# Patient Record
Sex: FEMALE | Race: BLACK OR AFRICAN AMERICAN | NOT HISPANIC OR LATINO | URBAN - METROPOLITAN AREA
[De-identification: names, ages, dates, MRNs, and addresses within clinical notes are randomized per-mention and may not be internally consistent; named-entity substitution may affect disease eponyms.]

---

## 2017-02-25 ENCOUNTER — EMERGENCY (EMERGENCY)
Facility: HOSPITAL | Age: 32
LOS: 1 days | Discharge: HOME | End: 2017-02-25
Attending: EMERGENCY MEDICINE | Admitting: EMERGENCY MEDICINE
Payer: COMMERCIAL

## 2017-02-25 VITALS
HEART RATE: 86 BPM | RESPIRATION RATE: 16 BRPM | SYSTOLIC BLOOD PRESSURE: 119 MMHG | TEMPERATURE: 99 F | OXYGEN SATURATION: 100 % | DIASTOLIC BLOOD PRESSURE: 72 MMHG

## 2017-02-25 VITALS
HEART RATE: 66 BPM | TEMPERATURE: 98 F | RESPIRATION RATE: 18 BRPM | DIASTOLIC BLOOD PRESSURE: 52 MMHG | SYSTOLIC BLOOD PRESSURE: 110 MMHG | OXYGEN SATURATION: 100 %

## 2017-02-25 DIAGNOSIS — Z98.890 OTHER SPECIFIED POSTPROCEDURAL STATES: Chronic | ICD-10-CM

## 2017-02-25 LAB
ALBUMIN SERPL ELPH-MCNC: 4.1 G/DL — SIGNIFICANT CHANGE UP (ref 3.3–5)
ALP SERPL-CCNC: 55 U/L — SIGNIFICANT CHANGE UP (ref 40–120)
ALT FLD-CCNC: 15 U/L — SIGNIFICANT CHANGE UP (ref 4–33)
APPEARANCE UR: CLEAR — SIGNIFICANT CHANGE UP
AST SERPL-CCNC: 19 U/L — SIGNIFICANT CHANGE UP (ref 4–32)
BACTERIA # UR AUTO: SIGNIFICANT CHANGE UP
BASOPHILS # BLD AUTO: 0.03 K/UL — SIGNIFICANT CHANGE UP (ref 0–0.2)
BASOPHILS NFR BLD AUTO: 0.2 % — SIGNIFICANT CHANGE UP (ref 0–2)
BILIRUB SERPL-MCNC: 0.2 MG/DL — SIGNIFICANT CHANGE UP (ref 0.2–1.2)
BILIRUB UR-MCNC: NEGATIVE — SIGNIFICANT CHANGE UP
BLOOD UR QL VISUAL: NEGATIVE — SIGNIFICANT CHANGE UP
BUN SERPL-MCNC: 12 MG/DL — SIGNIFICANT CHANGE UP (ref 7–23)
CALCIUM SERPL-MCNC: 9.1 MG/DL — SIGNIFICANT CHANGE UP (ref 8.4–10.5)
CHLORIDE SERPL-SCNC: 104 MMOL/L — SIGNIFICANT CHANGE UP (ref 98–107)
CO2 SERPL-SCNC: 23 MMOL/L — SIGNIFICANT CHANGE UP (ref 22–31)
COLOR SPEC: SIGNIFICANT CHANGE UP
CREAT SERPL-MCNC: 0.76 MG/DL — SIGNIFICANT CHANGE UP (ref 0.5–1.3)
EOSINOPHIL # BLD AUTO: 0.05 K/UL — SIGNIFICANT CHANGE UP (ref 0–0.5)
EOSINOPHIL NFR BLD AUTO: 0.3 % — SIGNIFICANT CHANGE UP (ref 0–6)
GLUCOSE SERPL-MCNC: 91 MG/DL — SIGNIFICANT CHANGE UP (ref 70–99)
GLUCOSE UR-MCNC: NEGATIVE — SIGNIFICANT CHANGE UP
HCG SERPL-ACNC: < 5 MIU/ML — SIGNIFICANT CHANGE UP
HCG UR-SCNC: NEGATIVE — SIGNIFICANT CHANGE UP
HCT VFR BLD CALC: 36.5 % — SIGNIFICANT CHANGE UP (ref 34.5–45)
HGB BLD-MCNC: 11.1 G/DL — LOW (ref 11.5–15.5)
HIV1 AG SER QL: SIGNIFICANT CHANGE UP
HIV1+2 AB SPEC QL: SIGNIFICANT CHANGE UP
IMM GRANULOCYTES NFR BLD AUTO: 0.3 % — SIGNIFICANT CHANGE UP (ref 0–1.5)
KETONES UR-MCNC: NEGATIVE — SIGNIFICANT CHANGE UP
LEUKOCYTE ESTERASE UR-ACNC: SIGNIFICANT CHANGE UP
LYMPHOCYTES # BLD AUTO: 21 % — SIGNIFICANT CHANGE UP (ref 13–44)
LYMPHOCYTES # BLD AUTO: 3.82 K/UL — HIGH (ref 1–3.3)
MCHC RBC-ENTMCNC: 23.7 PG — LOW (ref 27–34)
MCHC RBC-ENTMCNC: 30.4 % — LOW (ref 32–36)
MCV RBC AUTO: 78 FL — LOW (ref 80–100)
MONOCYTES # BLD AUTO: 1.04 K/UL — HIGH (ref 0–0.9)
MONOCYTES NFR BLD AUTO: 5.7 % — SIGNIFICANT CHANGE UP (ref 2–14)
MUCOUS THREADS # UR AUTO: SIGNIFICANT CHANGE UP
NEUTROPHILS # BLD AUTO: 13.23 K/UL — HIGH (ref 1.8–7.4)
NEUTROPHILS NFR BLD AUTO: 72.5 % — SIGNIFICANT CHANGE UP (ref 43–77)
NITRITE UR-MCNC: NEGATIVE — SIGNIFICANT CHANGE UP
PH UR: 6 — SIGNIFICANT CHANGE UP (ref 4.6–8)
PLATELET # BLD AUTO: 369 K/UL — SIGNIFICANT CHANGE UP (ref 150–400)
PMV BLD: 9.2 FL — SIGNIFICANT CHANGE UP (ref 7–13)
POTASSIUM SERPL-MCNC: 4.4 MMOL/L — SIGNIFICANT CHANGE UP (ref 3.5–5.3)
POTASSIUM SERPL-SCNC: 4.4 MMOL/L — SIGNIFICANT CHANGE UP (ref 3.5–5.3)
PROT SERPL-MCNC: 7.5 G/DL — SIGNIFICANT CHANGE UP (ref 6–8.3)
PROT UR-MCNC: 10 — SIGNIFICANT CHANGE UP
RBC # BLD: 4.68 M/UL — SIGNIFICANT CHANGE UP (ref 3.8–5.2)
RBC # FLD: 15.1 % — HIGH (ref 10.3–14.5)
RBC CASTS # UR COMP ASSIST: SIGNIFICANT CHANGE UP (ref 0–?)
SODIUM SERPL-SCNC: 141 MMOL/L — SIGNIFICANT CHANGE UP (ref 135–145)
SP GR SPEC: 1.02 — SIGNIFICANT CHANGE UP (ref 1–1.03)
SP GR UR: 1.02 — SIGNIFICANT CHANGE UP (ref 1–1.03)
SQUAMOUS # UR AUTO: SIGNIFICANT CHANGE UP
UROBILINOGEN FLD QL: NORMAL E.U. — SIGNIFICANT CHANGE UP (ref 0.1–0.2)
WBC # BLD: 18.23 K/UL — HIGH (ref 3.8–10.5)
WBC # FLD AUTO: 18.23 K/UL — HIGH (ref 3.8–10.5)
WBC UR QL: SIGNIFICANT CHANGE UP (ref 0–?)

## 2017-02-25 PROCEDURE — 74177 CT ABD & PELVIS W/CONTRAST: CPT | Mod: 26

## 2017-02-25 PROCEDURE — 76830 TRANSVAGINAL US NON-OB: CPT | Mod: 26

## 2017-02-25 PROCEDURE — 99285 EMERGENCY DEPT VISIT HI MDM: CPT

## 2017-02-25 RX ORDER — KETOROLAC TROMETHAMINE 30 MG/ML
30 SYRINGE (ML) INJECTION ONCE
Qty: 0 | Refills: 0 | Status: DISCONTINUED | OUTPATIENT
Start: 2017-02-25 | End: 2017-02-25

## 2017-02-25 RX ORDER — MORPHINE SULFATE 50 MG/1
4 CAPSULE, EXTENDED RELEASE ORAL ONCE
Qty: 0 | Refills: 0 | Status: DISCONTINUED | OUTPATIENT
Start: 2017-02-25 | End: 2017-02-25

## 2017-02-25 RX ORDER — ONDANSETRON 8 MG/1
4 TABLET, FILM COATED ORAL ONCE
Qty: 0 | Refills: 0 | Status: COMPLETED | OUTPATIENT
Start: 2017-02-25 | End: 2017-02-25

## 2017-02-25 RX ORDER — CEFTRIAXONE 500 MG/1
250 INJECTION, POWDER, FOR SOLUTION INTRAMUSCULAR; INTRAVENOUS ONCE
Qty: 0 | Refills: 0 | Status: COMPLETED | OUTPATIENT
Start: 2017-02-25 | End: 2017-02-25

## 2017-02-25 RX ADMIN — Medication 30 MILLIGRAM(S): at 19:36

## 2017-02-25 RX ADMIN — MORPHINE SULFATE 4 MILLIGRAM(S): 50 CAPSULE, EXTENDED RELEASE ORAL at 16:30

## 2017-02-25 RX ADMIN — MORPHINE SULFATE 4 MILLIGRAM(S): 50 CAPSULE, EXTENDED RELEASE ORAL at 16:15

## 2017-02-25 RX ADMIN — ONDANSETRON 4 MILLIGRAM(S): 8 TABLET, FILM COATED ORAL at 16:15

## 2017-02-25 RX ADMIN — Medication 30 MILLIGRAM(S): at 19:21

## 2017-02-25 RX ADMIN — CEFTRIAXONE 250 MILLIGRAM(S): 500 INJECTION, POWDER, FOR SOLUTION INTRAMUSCULAR; INTRAVENOUS at 22:17

## 2017-02-25 NOTE — ED ADULT TRIAGE NOTE - CHIEF COMPLAINT QUOTE
pt c/o suprapubic pelvic pain radiating to RLQ which woke her up in the middle of the night with chills. pt also reports dysuria and chills. denies medical history. pt appears very uncomfortable in triage noted to have difficulty standing and sitting pt c/o suprapubic pelvic pain radiating to RLQ which woke her up in the middle of the night with chills. pt also reports dysuria and chills. pt reports her period is late and is unsure if she is pregnant. denies medical history. pt appears very uncomfortable in triage noted to have difficulty standing and sitting

## 2017-02-25 NOTE — ED ADULT NURSE NOTE - CHIEF COMPLAINT QUOTE
pt c/o suprapubic pelvic pain radiating to RLQ which woke her up in the middle of the night with chills. pt also reports dysuria and chills. pt reports her period is late and is unsure if she is pregnant. denies medical history. pt appears very uncomfortable in triage noted to have difficulty standing and sitting

## 2017-02-25 NOTE — ED ADULT NURSE NOTE - OBJECTIVE STATEMENT
viri rn: pt ambulated to room #20, with c/o of pelvic pain that started last and woke her up from sleep. c/o of subjective fever/chills at home. denies vaginal bleeding/ discharge, n/v/d cp and sob. states when she is at the end of her urine stream she experiences burning.  pt aox4, vss, nad. IV placed, labs drawn and sent, pending MD tiwari. report given to primary RN.

## 2017-02-25 NOTE — ED ADULT NURSE REASSESSMENT NOTE - NS ED NURSE REASSESS COMMENT FT1
Received patient report from GEORGI Felix @ 2030.  Patient is awake, A&O x 3, NAD, presents to ED complaining of bilateral lower quadrant and pelvic pain for several days.  Patient denies dysuria, vaginal bleeding, dizziness, SOB or chest pain.  Past medical history of Endometriosis but denies any other past medical history.  Vitals taken and will continue to monitor patient.  Patient awaiting CT results.

## 2017-02-25 NOTE — ED PROVIDER NOTE - OBJECTIVE STATEMENT
Pt is c/o lower abd pain x days, no discharge, some nausea.  Mild urinary symptoms but does not feel like a UTI.  No vaginal bleeding.  Pain is constant. Pt is c/o lower abd pain x days, no discharge, some nausea.  Mild urinary symptoms but does not feel like a UTI.  No vaginal bleeding.  Pain is constant. LMP 1/9/16

## 2017-02-25 NOTE — ED PROVIDER NOTE - CHIEF COMPLAINT
The patient is a 31y Female complaining of The patient is a 31y Female complaining of suprapubic pain.

## 2017-02-25 NOTE — ED PROVIDER NOTE - ATTENDING CONTRIBUTION TO CARE
I performed a face to face evaluation of this patient and performed a full history and physical examination on the patient.  I agree with the resident's history, physical examination, and plan of the patient.  Pt with lower abdominal pain.  Belly soft but ttp bilateral lower abdomen.  Check labs, US< ua, consider CT I performed a face to face evaluation of this patient and performed a full history and physical examination on the patient except the pelvic exam.  I agree with the resident's history, physical examination, and plan of the patient.  Pt with lower abdominal pain.  Belly soft but ttp bilateral lower abdomen.  Check labs, US< ua, consider CT

## 2017-02-25 NOTE — ED PROVIDER NOTE - PROGRESS NOTE DETAILS
Pt feeling well, discussed results, will treat for presumed pid. Pt had gyn she will f/u with next week, given copies of results, ceftriaxone IM in Ed.

## 2017-02-26 LAB — SPECIMEN SOURCE: SIGNIFICANT CHANGE UP

## 2017-02-27 LAB
BACTERIA UR CULT: SIGNIFICANT CHANGE UP
C TRACH RRNA SPEC QL NAA+PROBE: SIGNIFICANT CHANGE UP
N GONORRHOEA RRNA SPEC QL NAA+PROBE: SIGNIFICANT CHANGE UP
SPECIMEN SOURCE: SIGNIFICANT CHANGE UP

## 2018-06-30 ENCOUNTER — EMERGENCY (EMERGENCY)
Facility: HOSPITAL | Age: 33
LOS: 1 days | Discharge: HOME | End: 2018-06-30
Attending: EMERGENCY MEDICINE | Admitting: EMERGENCY MEDICINE
Payer: COMMERCIAL

## 2018-06-30 VITALS
TEMPERATURE: 98 F | OXYGEN SATURATION: 100 % | HEART RATE: 80 BPM | RESPIRATION RATE: 17 BRPM | SYSTOLIC BLOOD PRESSURE: 146 MMHG | DIASTOLIC BLOOD PRESSURE: 103 MMHG

## 2018-06-30 DIAGNOSIS — Z98.890 OTHER SPECIFIED POSTPROCEDURAL STATES: Chronic | ICD-10-CM

## 2018-06-30 PROCEDURE — 99283 EMERGENCY DEPT VISIT LOW MDM: CPT

## 2018-06-30 NOTE — ED ADULT TRIAGE NOTE - CHIEF COMPLAINT QUOTE
Pt c/o dizziness, lightheadedness for a couple weeks worsened today while sitting down at work. Pt states "I feel like I have no energy, I feel like passing out." Denies SOB, chest pain. Denies pmhx. Reports had a stress test on Thurs, did not receive official results yet. EKG in progress.

## 2018-06-30 NOTE — ED PROVIDER NOTE - PROGRESS NOTE DETAILS
Reena: negative orthostatic, ekg normal. Patient with appropriate workup completed outpatient. To receive MRI as per PMD which sounds appropriate. Benign physical exam with no focal findings. Patient to be instructed to follow up with her pcp/cardiologist

## 2018-06-30 NOTE — ED PROVIDER NOTE - MEDICAL DECISION MAKING DETAILS
32F presenting with 2 weeks of lightheadedness, lethargy. DDx includes anemia however, cbc 2 weeks ago was wnl and no reports of bleeding. Possible hypothyroidism however, no symptoms of cold intolerance, changes in weight. No focal deficits on neurological exam to suggest acute intracranial pathology

## 2018-06-30 NOTE — ED PROVIDER NOTE - CARE PLAN
Principal Discharge DX:	Lightheadedness  Assessment and plan of treatment:	Follow up with your primary care physician

## 2018-06-30 NOTE — ED PROVIDER NOTE - OBJECTIVE STATEMENT
32F with no pmh presenting with 2-3 weeks of lightheadedness, lethargy, decreased energy. Symptoms are intermittent with no obvious exacerbating factors. Patient was seen by PMD 2 weeks ago with cbc evaluation with was unremarkable as per patient. Recently had a stress test completed 2 days ago and is unsure of the results however, noted no problems during test. Denies fever, chills, cp, sob, n/v/d, vertigo, changes in vision, dysuria, vaginal bleeding or discharge 32F with no pmh presenting with 2-3 weeks of lightheadedness, lethargy, decreased energy. Symptoms are intermittent with no obvious exacerbating factors. Patient was seen by PMD 2 weeks ago with cbc evaluation with was unremarkable as per patient. Recently had a stress test completed 2 days ago and is unsure of the results however, noted no problems during test. States that per PMD wanted to obtain an MRI however, has not been able to schedule appointment yet. Denies fever, chills, cp, sob, n/v/d, vertigo, changes in vision, dysuria, vaginal bleeding or discharge, changes in weight

## 2018-06-30 NOTE — ED PROVIDER NOTE - ATTENDING CONTRIBUTION TO CARE
agree with resident note  "32F with no pmh presenting with 2-3 weeks of lightheadedness, lethargy, decreased energy."  Denies focal weakness, numbness, gait disturbances, visual changes.  Denies hx of thyroid disease.  Denies fever, coughing, urinary symptoms    PE: well appearing; VSS: CTAB/L; s1 s2 no m/r/g abd soft/NT/ND ext: no edema Neuro: CNs intact 5/5 motor UE and LE; sensation intact

## 2020-04-26 ENCOUNTER — MESSAGE (OUTPATIENT)
Age: 35
End: 2020-04-26

## 2020-05-14 LAB
SARS-COV-2 IGG SERPL IA-ACNC: 0 INDEX
SARS-COV-2 IGG SERPL QL IA: NEGATIVE

## 2022-04-23 PROBLEM — N80.9 ENDOMETRIOSIS, UNSPECIFIED: Chronic | Status: ACTIVE | Noted: 2017-02-25

## 2022-04-28 ENCOUNTER — APPOINTMENT (OUTPATIENT)
Dept: OBGYN | Facility: CLINIC | Age: 37
End: 2022-04-28
Payer: MEDICAID

## 2022-04-28 ENCOUNTER — OUTPATIENT (OUTPATIENT)
Dept: OUTPATIENT SERVICES | Facility: HOSPITAL | Age: 37
LOS: 1 days | End: 2022-04-28
Payer: MEDICAID

## 2022-04-28 VITALS
SYSTOLIC BLOOD PRESSURE: 118 MMHG | WEIGHT: 253 LBS | DIASTOLIC BLOOD PRESSURE: 77 MMHG | HEIGHT: 68.5 IN | RESPIRATION RATE: 16 BRPM | BODY MASS INDEX: 37.9 KG/M2 | HEART RATE: 85 BPM | TEMPERATURE: 98.1 F | OXYGEN SATURATION: 99 %

## 2022-04-28 DIAGNOSIS — O09.299 SUPERVISION OF PREGNANCY WITH OTHER POOR REPRODUCTIVE OR OBSTETRIC HISTORY, UNSPECIFIED TRIMESTER: ICD-10-CM

## 2022-04-28 DIAGNOSIS — Z80.0 FAMILY HISTORY OF MALIGNANT NEOPLASM OF DIGESTIVE ORGANS: ICD-10-CM

## 2022-04-28 DIAGNOSIS — Z98.890 SUPERVISION OF PREGNANCY WITH OTHER POOR REPRODUCTIVE OR OBSTETRIC HISTORY, UNSPECIFIED TRIMESTER: ICD-10-CM

## 2022-04-28 DIAGNOSIS — Z78.9 OTHER SPECIFIED HEALTH STATUS: ICD-10-CM

## 2022-04-28 DIAGNOSIS — Z00.00 ENCOUNTER FOR GENERAL ADULT MEDICAL EXAMINATION W/OUT ABNORMAL FINDINGS: ICD-10-CM

## 2022-04-28 DIAGNOSIS — Z98.890 OTHER SPECIFIED POSTPROCEDURAL STATES: Chronic | ICD-10-CM

## 2022-04-28 DIAGNOSIS — Z83.3 FAMILY HISTORY OF DIABETES MELLITUS: ICD-10-CM

## 2022-04-28 DIAGNOSIS — Z82.49 FAMILY HISTORY OF ISCHEMIC HEART DISEASE AND OTHER DISEASES OF THE CIRCULATORY SYSTEM: ICD-10-CM

## 2022-04-28 DIAGNOSIS — Z87.51 PERSONAL HISTORY OF PRE-TERM LABOR: ICD-10-CM

## 2022-04-28 DIAGNOSIS — Z34.00 ENCOUNTER FOR SUPERVISION OF NORMAL FIRST PREGNANCY, UNSPECIFIED TRIMESTER: ICD-10-CM

## 2022-04-28 PROCEDURE — 86780 TREPONEMA PALLIDUM: CPT

## 2022-04-28 PROCEDURE — 83655 ASSAY OF LEAD: CPT

## 2022-04-28 PROCEDURE — 84439 ASSAY OF FREE THYROXINE: CPT

## 2022-04-28 PROCEDURE — 80053 COMPREHEN METABOLIC PANEL: CPT

## 2022-04-28 PROCEDURE — 87624 HPV HI-RISK TYP POOLED RSLT: CPT

## 2022-04-28 PROCEDURE — 81025 URINE PREGNANCY TEST: CPT

## 2022-04-28 PROCEDURE — 86787 VARICELLA-ZOSTER ANTIBODY: CPT

## 2022-04-28 PROCEDURE — 99204 OFFICE O/P NEW MOD 45 MIN: CPT

## 2022-04-28 PROCEDURE — G0463: CPT

## 2022-04-28 PROCEDURE — 86481 TB AG RESPONSE T-CELL SUSP: CPT

## 2022-04-28 PROCEDURE — 81220 CFTR GENE COM VARIANTS: CPT

## 2022-04-28 PROCEDURE — 86803 HEPATITIS C AB TEST: CPT

## 2022-04-28 PROCEDURE — 85025 COMPLETE CBC W/AUTO DIFF WBC: CPT

## 2022-04-28 PROCEDURE — 87389 HIV-1 AG W/HIV-1&-2 AB AG IA: CPT

## 2022-04-28 PROCEDURE — 86901 BLOOD TYPING SEROLOGIC RH(D): CPT

## 2022-04-28 PROCEDURE — 87491 CHLMYD TRACH DNA AMP PROBE: CPT

## 2022-04-28 PROCEDURE — 86765 RUBEOLA ANTIBODY: CPT

## 2022-04-28 PROCEDURE — 86762 RUBELLA ANTIBODY: CPT

## 2022-04-28 PROCEDURE — 84443 ASSAY THYROID STIM HORMONE: CPT

## 2022-04-28 PROCEDURE — 83020 HEMOGLOBIN ELECTROPHORESIS: CPT

## 2022-04-28 PROCEDURE — 86850 RBC ANTIBODY SCREEN: CPT

## 2022-04-28 PROCEDURE — 86900 BLOOD TYPING SEROLOGIC ABO: CPT

## 2022-04-28 PROCEDURE — 81243 FMR1 GEN ALY DETC ABNL ALLEL: CPT

## 2022-04-28 PROCEDURE — 87086 URINE CULTURE/COLONY COUNT: CPT

## 2022-04-28 PROCEDURE — 87591 N.GONORRHOEAE DNA AMP PROB: CPT

## 2022-04-28 PROCEDURE — 87340 HEPATITIS B SURFACE AG IA: CPT

## 2022-04-28 PROCEDURE — 81003 URINALYSIS AUTO W/O SCOPE: CPT

## 2022-04-28 PROCEDURE — 81329 SMN1 GENE DOS/DELETION ALYS: CPT

## 2022-04-28 PROCEDURE — 36415 COLL VENOUS BLD VENIPUNCTURE: CPT

## 2022-05-03 DIAGNOSIS — Z34.91 ENCOUNTER FOR SUPERVISION OF NORMAL PREGNANCY, UNSPECIFIED, FIRST TRIMESTER: ICD-10-CM

## 2022-05-03 DIAGNOSIS — Z00.00 ENCOUNTER FOR GENERAL ADULT MEDICAL EXAMINATION WITHOUT ABNORMAL FINDINGS: ICD-10-CM

## 2022-05-03 DIAGNOSIS — Z3A.08 8 WEEKS GESTATION OF PREGNANCY: ICD-10-CM

## 2022-05-05 ENCOUNTER — NON-APPOINTMENT (OUTPATIENT)
Age: 37
End: 2022-05-05

## 2022-05-05 LAB
ABO + RH PNL BLD: NORMAL
ALBUMIN SERPL ELPH-MCNC: 4.4 G/DL
ALP BLD-CCNC: 54 U/L
ALT SERPL-CCNC: 12 U/L
ANION GAP SERPL CALC-SCNC: 13 MMOL/L
APPEARANCE: CLEAR
AR GENE MUT ANL BLD/T: NORMAL
AST SERPL-CCNC: 14 U/L
BACTERIA UR CULT: ABNORMAL
BASOPHILS # BLD AUTO: 0.07 K/UL
BASOPHILS NFR BLD AUTO: 0.5 %
BILIRUB SERPL-MCNC: <0.2 MG/DL
BILIRUBIN URINE: NEGATIVE
BLD GP AB SCN SERPL QL: NORMAL
BLOOD URINE: NEGATIVE
BUN SERPL-MCNC: 9 MG/DL
C TRACH RRNA SPEC QL NAA+PROBE: NOT DETECTED
CALCIUM SERPL-MCNC: 9.7 MG/DL
CFTR MUT TESTED BLD/T: NEGATIVE
CHLORIDE SERPL-SCNC: 100 MMOL/L
CO2 SERPL-SCNC: 22 MMOL/L
COLOR: NORMAL
CREAT SERPL-MCNC: 0.68 MG/DL
CYTOLOGY CVX/VAG DOC THIN PREP: NORMAL
EGFR: 114 ML/MIN/1.73M2
EOSINOPHIL # BLD AUTO: 0.07 K/UL
EOSINOPHIL NFR BLD AUTO: 0.5 %
FMR1 GENE MUT ANL BLD/T: NORMAL
GLUCOSE QUALITATIVE U: NEGATIVE
GLUCOSE SERPL-MCNC: 85 MG/DL
HBV SURFACE AG SER QL: NONREACTIVE
HCT VFR BLD CALC: 37.6 %
HCV AB SER QL: NONREACTIVE
HCV S/CO RATIO: 0.1 S/CO
HGB A MFR BLD: 96.9 %
HGB A2 MFR BLD: 2.8 %
HGB BLD-MCNC: 11.6 G/DL
HGB F MFR BLD: 0.3 %
HGB FRACT BLD-IMP: NORMAL
HIV1+2 AB SPEC QL IA.RAPID: NONREACTIVE
HPV HIGH+LOW RISK DNA PNL CVX: NOT DETECTED
IMM GRANULOCYTES NFR BLD AUTO: 0.5 %
KETONES URINE: NEGATIVE
LEAD BLD-MCNC: <1 UG/DL
LEUKOCYTE ESTERASE URINE: NEGATIVE
LYMPHOCYTES # BLD AUTO: 4.13 K/UL
LYMPHOCYTES NFR BLD AUTO: 29 %
M TB IFN-G BLD-IMP: NEGATIVE
MAN DIFF?: NORMAL
MCHC RBC-ENTMCNC: 27.8 PG
MCHC RBC-ENTMCNC: 30.9 GM/DL
MCV RBC AUTO: 90.2 FL
MEV IGG FLD QL IA: >300 AU/ML
MEV IGG+IGM SER-IMP: POSITIVE
MONOCYTES # BLD AUTO: 0.96 K/UL
MONOCYTES NFR BLD AUTO: 6.7 %
N GONORRHOEA RRNA SPEC QL NAA+PROBE: NOT DETECTED
NEUTROPHILS # BLD AUTO: 8.95 K/UL
NEUTROPHILS NFR BLD AUTO: 62.8 %
NITRITE URINE: NEGATIVE
PH URINE: 6
PLATELET # BLD AUTO: 347 K/UL
POTASSIUM SERPL-SCNC: 4.2 MMOL/L
PROT SERPL-MCNC: 7.2 G/DL
PROTEIN URINE: NORMAL
QUANTIFERON TB PLUS MITOGEN MINUS NIL: 9.99 IU/ML
QUANTIFERON TB PLUS NIL: 0.01 IU/ML
QUANTIFERON TB PLUS TB1 MINUS NIL: 0 IU/ML
QUANTIFERON TB PLUS TB2 MINUS NIL: 0 IU/ML
RBC # BLD: 4.17 M/UL
RBC # FLD: 13.7 %
RUBV IGG FLD-ACNC: 8.6 INDEX
RUBV IGG SER-IMP: POSITIVE
SODIUM SERPL-SCNC: 136 MMOL/L
SOURCE TP AMPLIFICATION: NORMAL
SPECIFIC GRAVITY URINE: 1.02
T PALLIDUM AB SER QL IA: NEGATIVE
T4 FREE SERPL-MCNC: 1.1 NG/DL
TSH SERPL-ACNC: 1.13 UIU/ML
UROBILINOGEN URINE: NORMAL
VZV AB TITR SER: POSITIVE
VZV IGG SER IF-ACNC: 325.2 INDEX
WBC # FLD AUTO: 14.25 K/UL

## 2022-06-02 ENCOUNTER — NON-APPOINTMENT (OUTPATIENT)
Age: 37
End: 2022-06-02

## 2022-06-02 ENCOUNTER — APPOINTMENT (OUTPATIENT)
Dept: ANTEPARTUM | Facility: CLINIC | Age: 37
End: 2022-06-02

## 2022-06-02 ENCOUNTER — APPOINTMENT (OUTPATIENT)
Dept: OBGYN | Facility: CLINIC | Age: 37
End: 2022-06-02

## 2022-07-02 NOTE — ED PROVIDER NOTE - CPE EDP RESP NORM
[facility]  Vaginal Delivery Discharge Summary      Patient: nAnette Garsia      MR#:[id number]  Admission  Diagnosis: Term Pregnancy  Discharge Diagnosis: Vaginal Delivery    Date of Admission: 6/29/2022  Date of Discharge:  7/2/2022    Procedures:  Vaginal, Spontaneous     6/30/2022    10:08 AM      Service:  Obstetrics    Hospital Course:  Patient underwent vaginal delivery and remained in the hospital for 2 days.  During that time she remained afebrile and hemodynamically stable.  On the day of discharge, she was eating, ambulating and voiding without difficulty.      Lab Results   Component Value Date    WBC 10.11 06/30/2022    HGB 9.6 (L) 07/01/2022    HCT 29.2 (L) 07/01/2022    MCV 83.6 06/30/2022     06/30/2022     Results from last 7 days   Lab Units 06/30/22  0103   ABO TYPING  O   RH TYPING  Positive   ANTIBODY SCREEN  Negative       Discharge Medications     Discharge Medications      Continue These Medications      Instructions Start Date   Prenatal 27-1 27-1 MG tablet tablet   1 tablet, Oral, Daily             Discharge Disposition:  To Home    Discharge Condition:  Stable    Discharge Diet: regular    Activity at Discharge: Pelvic rest    Follow-up Appointments   6 weeks      Cathy Phillip CNM  07/02/22  12:46 EDT  
normal...

## 2022-07-19 ENCOUNTER — APPOINTMENT (OUTPATIENT)
Dept: OBGYN | Facility: CLINIC | Age: 37
End: 2022-07-19

## 2022-07-19 ENCOUNTER — OUTPATIENT (OUTPATIENT)
Dept: OUTPATIENT SERVICES | Facility: HOSPITAL | Age: 37
LOS: 1 days | End: 2022-07-19
Payer: COMMERCIAL

## 2022-07-19 VITALS
BODY MASS INDEX: 37.76 KG/M2 | SYSTOLIC BLOOD PRESSURE: 102 MMHG | HEIGHT: 68.5 IN | HEART RATE: 79 BPM | WEIGHT: 252 LBS | OXYGEN SATURATION: 99 % | TEMPERATURE: 98.2 F | DIASTOLIC BLOOD PRESSURE: 67 MMHG

## 2022-07-19 DIAGNOSIS — O09.521 SUPERVISION OF ELDERLY MULTIGRAVIDA, FIRST TRIMESTER: ICD-10-CM

## 2022-07-19 DIAGNOSIS — O03.9 COMPLETE OR UNSPECIFIED SPONTANEOUS ABORTION W/OUT COMPLICATION: ICD-10-CM

## 2022-07-19 DIAGNOSIS — R82.71 BACTERIURIA: ICD-10-CM

## 2022-07-19 DIAGNOSIS — Z3A.08 8 WEEKS GESTATION OF PREGNANCY: ICD-10-CM

## 2022-07-19 DIAGNOSIS — E66.9 OBESITY, UNSPECIFIED: ICD-10-CM

## 2022-07-19 DIAGNOSIS — Z34.00 ENCOUNTER FOR SUPERVISION OF NORMAL FIRST PREGNANCY, UNSPECIFIED TRIMESTER: ICD-10-CM

## 2022-07-19 DIAGNOSIS — Z98.890 OTHER SPECIFIED POSTPROCEDURAL STATES: Chronic | ICD-10-CM

## 2022-07-19 PROCEDURE — 84439 ASSAY OF FREE THYROXINE: CPT

## 2022-07-19 PROCEDURE — 80053 COMPREHEN METABOLIC PANEL: CPT

## 2022-07-19 PROCEDURE — 85025 COMPLETE CBC W/AUTO DIFF WBC: CPT

## 2022-07-19 PROCEDURE — 83036 HEMOGLOBIN GLYCOSYLATED A1C: CPT

## 2022-07-19 PROCEDURE — 36415 COLL VENOUS BLD VENIPUNCTURE: CPT

## 2022-07-19 PROCEDURE — G0463: CPT

## 2022-07-19 PROCEDURE — 36415 COLL VENOUS BLD VENIPUNCTURE: CPT | Mod: NC

## 2022-07-19 PROCEDURE — 83001 ASSAY OF GONADOTROPIN (FSH): CPT

## 2022-07-19 PROCEDURE — 84702 CHORIONIC GONADOTROPIN TEST: CPT

## 2022-07-19 PROCEDURE — 84146 ASSAY OF PROLACTIN: CPT

## 2022-07-19 PROCEDURE — 84443 ASSAY THYROID STIM HORMONE: CPT

## 2022-07-19 PROCEDURE — 99214 OFFICE O/P EST MOD 30 MIN: CPT

## 2022-07-19 RX ORDER — FOLIC ACID/MULTIVIT,IRON,MINER 0.4MG-18MG
200 TABLET ORAL
Qty: 90 | Refills: 2 | Status: COMPLETED | OUTPATIENT
Start: 2022-04-28 | End: 2022-07-19

## 2022-07-19 RX ORDER — AMPICILLIN 500 MG/1
500 CAPSULE ORAL 4 TIMES DAILY
Qty: 20 | Refills: 0 | Status: COMPLETED | OUTPATIENT
Start: 2022-05-05 | End: 2022-07-19

## 2022-07-19 NOTE — HISTORY OF PRESENT ILLNESS
[FreeTextEntry1] : Patient had a miscarriage in May when she was on vacation in [place], was evaluated by Ob/Gyn there, medically cleared and returned to [place] without any further complications.  Pateint states she had a regular period mid-June lasting ~ 3-4 days, then last period on ((07/17/22) pt experienced heavy vaginal bleeding on the first day requiring changing pads hourly, passing blood clots for several hours, then bleeding subsided, currently spotting minimally.  Patient didn't go to an ER for evaluation at the time of heavy vaginal bleeding.   Currently presenting for follow up of her hx/o menorrhagia.  Patient states her periods are regular, q 28 days, lasting 3-4 days, never passes blood clots or experiences heavy vaginal bleeding.\par \par Last Pap (05/05/2022) Neg / (-) HPV

## 2022-07-20 DIAGNOSIS — N93.8 OTHER SPECIFIED ABNORMAL UTERINE AND VAGINAL BLEEDING: ICD-10-CM

## 2022-07-20 DIAGNOSIS — E66.9 OBESITY, UNSPECIFIED: ICD-10-CM

## 2022-07-20 LAB
ALBUMIN SERPL ELPH-MCNC: 4.3 G/DL
ALP BLD-CCNC: 61 U/L
ALT SERPL-CCNC: 15 U/L
ANION GAP SERPL CALC-SCNC: 13 MMOL/L
AST SERPL-CCNC: 15 U/L
BASOPHILS # BLD AUTO: 0.08 K/UL
BASOPHILS NFR BLD AUTO: 0.7 %
BILIRUB SERPL-MCNC: <0.2 MG/DL
BUN SERPL-MCNC: 10 MG/DL
CALCIUM SERPL-MCNC: 9.5 MG/DL
CHLORIDE SERPL-SCNC: 104 MMOL/L
CO2 SERPL-SCNC: 25 MMOL/L
CREAT SERPL-MCNC: 0.78 MG/DL
EGFR: 100 ML/MIN/1.73M2
EOSINOPHIL # BLD AUTO: 0.18 K/UL
EOSINOPHIL NFR BLD AUTO: 1.6 %
ESTIMATED AVERAGE GLUCOSE: 134 MG/DL
GLUCOSE SERPL-MCNC: 69 MG/DL
HBA1C MFR BLD HPLC: 6.3 %
HCT VFR BLD CALC: 37.1 %
HGB BLD-MCNC: 11.3 G/DL
IMM GRANULOCYTES NFR BLD AUTO: 0.3 %
LYMPHOCYTES # BLD AUTO: 4.12 K/UL
LYMPHOCYTES NFR BLD AUTO: 35.6 %
MAN DIFF?: NORMAL
MCHC RBC-ENTMCNC: 27.4 PG
MCHC RBC-ENTMCNC: 30.5 GM/DL
MCV RBC AUTO: 89.8 FL
MONOCYTES # BLD AUTO: 0.84 K/UL
MONOCYTES NFR BLD AUTO: 7.3 %
NEUTROPHILS # BLD AUTO: 6.33 K/UL
NEUTROPHILS NFR BLD AUTO: 54.5 %
PLATELET # BLD AUTO: 391 K/UL
POTASSIUM SERPL-SCNC: 4.1 MMOL/L
PROT SERPL-MCNC: 7.2 G/DL
RBC # BLD: 4.13 M/UL
RBC # FLD: 13.2 %
SODIUM SERPL-SCNC: 141 MMOL/L
WBC # FLD AUTO: 11.58 K/UL

## 2022-08-11 ENCOUNTER — APPOINTMENT (OUTPATIENT)
Dept: ULTRASOUND IMAGING | Facility: HOSPITAL | Age: 37
End: 2022-08-11

## 2022-08-11 ENCOUNTER — OUTPATIENT (OUTPATIENT)
Dept: OUTPATIENT SERVICES | Facility: HOSPITAL | Age: 37
LOS: 1 days | End: 2022-08-11
Payer: COMMERCIAL

## 2022-08-11 DIAGNOSIS — N93.8 OTHER SPECIFIED ABNORMAL UTERINE AND VAGINAL BLEEDING: ICD-10-CM

## 2022-08-11 DIAGNOSIS — Z98.890 OTHER SPECIFIED POSTPROCEDURAL STATES: Chronic | ICD-10-CM

## 2022-08-11 PROCEDURE — 76830 TRANSVAGINAL US NON-OB: CPT

## 2022-08-11 PROCEDURE — 76830 TRANSVAGINAL US NON-OB: CPT | Mod: 26

## 2022-08-11 PROCEDURE — 76856 US EXAM PELVIC COMPLETE: CPT | Mod: 26

## 2022-08-11 PROCEDURE — 76856 US EXAM PELVIC COMPLETE: CPT

## 2022-08-18 ENCOUNTER — OUTPATIENT (OUTPATIENT)
Dept: OUTPATIENT SERVICES | Facility: HOSPITAL | Age: 37
LOS: 1 days | End: 2022-08-18
Payer: COMMERCIAL

## 2022-08-18 ENCOUNTER — APPOINTMENT (OUTPATIENT)
Dept: OBGYN | Facility: CLINIC | Age: 37
End: 2022-08-18

## 2022-08-18 VITALS
SYSTOLIC BLOOD PRESSURE: 111 MMHG | HEART RATE: 70 BPM | BODY MASS INDEX: 37.76 KG/M2 | DIASTOLIC BLOOD PRESSURE: 73 MMHG | HEIGHT: 68.5 IN | TEMPERATURE: 98.1 F | OXYGEN SATURATION: 99 % | WEIGHT: 252 LBS

## 2022-08-18 DIAGNOSIS — Z00.00 ENCOUNTER FOR GENERAL ADULT MEDICAL EXAMINATION WITHOUT ABNORMAL FINDINGS: ICD-10-CM

## 2022-08-18 DIAGNOSIS — Z98.890 OTHER SPECIFIED POSTPROCEDURAL STATES: Chronic | ICD-10-CM

## 2022-08-18 DIAGNOSIS — N93.8 OTHER SPECIFIED ABNORMAL UTERINE AND VAGINAL BLEEDING: ICD-10-CM

## 2022-08-18 PROCEDURE — 99213 OFFICE O/P EST LOW 20 MIN: CPT

## 2022-08-18 PROCEDURE — G0463: CPT

## 2022-08-18 NOTE — HISTORY OF PRESENT ILLNESS
[Patient reported PAP Smear was normal] : Patient reported PAP Smear was normal [FreeTextEntry1] : CC: Pt is here to discuss results.\par HPI: h/o one heavy period after spAB in May.\par Period in Aug is light, only 2-3 days\par UPT neg\par \par Pelvic sono 8/2022 wnl\par CBC- No anemia- Elevated WBC- dw/pt. F/u w/ PCP\par DyP7J=4.3- PT states that her PCP knows. She is to diet and lose weight. [PapSmeardate] : 4/28/2022 [TextBox_31] : WNL

## 2022-08-19 DIAGNOSIS — N93.8 OTHER SPECIFIED ABNORMAL UTERINE AND VAGINAL BLEEDING: ICD-10-CM

## 2022-11-16 ENCOUNTER — INPATIENT (INPATIENT)
Facility: HOSPITAL | Age: 37
LOS: 1 days | Discharge: HOME | End: 2022-11-18
Attending: SURGERY | Admitting: SURGERY

## 2022-11-16 ENCOUNTER — TRANSCRIPTION ENCOUNTER (OUTPATIENT)
Age: 37
End: 2022-11-16

## 2022-11-16 VITALS
OXYGEN SATURATION: 100 % | SYSTOLIC BLOOD PRESSURE: 119 MMHG | RESPIRATION RATE: 20 BRPM | HEART RATE: 79 BPM | DIASTOLIC BLOOD PRESSURE: 72 MMHG | TEMPERATURE: 98 F

## 2022-11-16 DIAGNOSIS — Z98.890 OTHER SPECIFIED POSTPROCEDURAL STATES: Chronic | ICD-10-CM

## 2022-11-16 DIAGNOSIS — K80.20 CALCULUS OF GALLBLADDER WITHOUT CHOLECYSTITIS WITHOUT OBSTRUCTION: ICD-10-CM

## 2022-11-16 LAB
ALBUMIN SERPL ELPH-MCNC: 4.1 G/DL — SIGNIFICANT CHANGE UP (ref 3.3–5)
ALP SERPL-CCNC: 50 U/L — SIGNIFICANT CHANGE UP (ref 40–120)
ALT FLD-CCNC: 15 U/L — SIGNIFICANT CHANGE UP (ref 4–33)
ANION GAP SERPL CALC-SCNC: 12 MMOL/L — SIGNIFICANT CHANGE UP (ref 7–14)
APTT BLD: 28.4 SEC — SIGNIFICANT CHANGE UP (ref 27–36.3)
AST SERPL-CCNC: 21 U/L — SIGNIFICANT CHANGE UP (ref 4–32)
BASOPHILS # BLD AUTO: 0.06 K/UL — SIGNIFICANT CHANGE UP (ref 0–0.2)
BASOPHILS NFR BLD AUTO: 0.4 % — SIGNIFICANT CHANGE UP (ref 0–2)
BILIRUB SERPL-MCNC: <0.2 MG/DL — SIGNIFICANT CHANGE UP (ref 0.2–1.2)
BUN SERPL-MCNC: 10 MG/DL — SIGNIFICANT CHANGE UP (ref 7–23)
CALCIUM SERPL-MCNC: 9.4 MG/DL — SIGNIFICANT CHANGE UP (ref 8.4–10.5)
CHLORIDE SERPL-SCNC: 101 MMOL/L — SIGNIFICANT CHANGE UP (ref 98–107)
CO2 SERPL-SCNC: 23 MMOL/L — SIGNIFICANT CHANGE UP (ref 22–31)
CREAT SERPL-MCNC: 0.7 MG/DL — SIGNIFICANT CHANGE UP (ref 0.5–1.3)
EGFR: 113 ML/MIN/1.73M2 — SIGNIFICANT CHANGE UP
EOSINOPHIL # BLD AUTO: 0 K/UL — SIGNIFICANT CHANGE UP (ref 0–0.5)
EOSINOPHIL NFR BLD AUTO: 0 % — SIGNIFICANT CHANGE UP (ref 0–6)
GLUCOSE SERPL-MCNC: 120 MG/DL — HIGH (ref 70–99)
HCG SERPL-ACNC: 2112 MIU/ML — SIGNIFICANT CHANGE UP
HCT VFR BLD CALC: 39.5 % — SIGNIFICANT CHANGE UP (ref 34.5–45)
HGB BLD-MCNC: 12.3 G/DL — SIGNIFICANT CHANGE UP (ref 11.5–15.5)
HIV 1+2 AB+HIV1 P24 AG SERPL QL IA: SIGNIFICANT CHANGE UP
IANC: 12.77 K/UL — HIGH (ref 1.8–7.4)
IMM GRANULOCYTES NFR BLD AUTO: 0.7 % — SIGNIFICANT CHANGE UP (ref 0–0.9)
INR BLD: 1.07 RATIO — SIGNIFICANT CHANGE UP (ref 0.88–1.16)
LIDOCAIN IGE QN: 24 U/L — SIGNIFICANT CHANGE UP (ref 7–60)
LYMPHOCYTES # BLD AUTO: 1.87 K/UL — SIGNIFICANT CHANGE UP (ref 1–3.3)
LYMPHOCYTES # BLD AUTO: 12.1 % — LOW (ref 13–44)
MAGNESIUM SERPL-MCNC: 1.7 MG/DL — SIGNIFICANT CHANGE UP (ref 1.6–2.6)
MCHC RBC-ENTMCNC: 26.3 PG — LOW (ref 27–34)
MCHC RBC-ENTMCNC: 31.1 GM/DL — LOW (ref 32–36)
MCV RBC AUTO: 84.4 FL — SIGNIFICANT CHANGE UP (ref 80–100)
MONOCYTES # BLD AUTO: 0.67 K/UL — SIGNIFICANT CHANGE UP (ref 0–0.9)
MONOCYTES NFR BLD AUTO: 4.3 % — SIGNIFICANT CHANGE UP (ref 2–14)
NEUTROPHILS # BLD AUTO: 12.77 K/UL — HIGH (ref 1.8–7.4)
NEUTROPHILS NFR BLD AUTO: 82.5 % — HIGH (ref 43–77)
NRBC # BLD: 0 /100 WBCS — SIGNIFICANT CHANGE UP (ref 0–0)
NRBC # FLD: 0 K/UL — SIGNIFICANT CHANGE UP (ref 0–0)
PLATELET # BLD AUTO: 431 K/UL — HIGH (ref 150–400)
POTASSIUM SERPL-MCNC: 4.3 MMOL/L — SIGNIFICANT CHANGE UP (ref 3.5–5.3)
POTASSIUM SERPL-SCNC: 4.3 MMOL/L — SIGNIFICANT CHANGE UP (ref 3.5–5.3)
PROT SERPL-MCNC: 7.3 G/DL — SIGNIFICANT CHANGE UP (ref 6–8.3)
PROTHROM AB SERPL-ACNC: 12.4 SEC — SIGNIFICANT CHANGE UP (ref 10.5–13.4)
RBC # BLD: 4.68 M/UL — SIGNIFICANT CHANGE UP (ref 3.8–5.2)
RBC # FLD: 14.1 % — SIGNIFICANT CHANGE UP (ref 10.3–14.5)
SARS-COV-2 RNA SPEC QL NAA+PROBE: SIGNIFICANT CHANGE UP
SODIUM SERPL-SCNC: 136 MMOL/L — SIGNIFICANT CHANGE UP (ref 135–145)
WBC # BLD: 15.48 K/UL — HIGH (ref 3.8–10.5)
WBC # FLD AUTO: 15.48 K/UL — HIGH (ref 3.8–10.5)

## 2022-11-16 PROCEDURE — 99253 IP/OBS CNSLTJ NEW/EST LOW 45: CPT | Mod: GC

## 2022-11-16 PROCEDURE — 99285 EMERGENCY DEPT VISIT HI MDM: CPT

## 2022-11-16 PROCEDURE — 99222 1ST HOSP IP/OBS MODERATE 55: CPT | Mod: 57

## 2022-11-16 PROCEDURE — 76705 ECHO EXAM OF ABDOMEN: CPT | Mod: 26

## 2022-11-16 PROCEDURE — 76830 TRANSVAGINAL US NON-OB: CPT | Mod: 26

## 2022-11-16 RX ORDER — SODIUM CHLORIDE 9 MG/ML
1000 INJECTION INTRAMUSCULAR; INTRAVENOUS; SUBCUTANEOUS ONCE
Refills: 0 | Status: COMPLETED | OUTPATIENT
Start: 2022-11-16 | End: 2022-11-16

## 2022-11-16 RX ORDER — ESOMEPRAZOLE MAGNESIUM 40 MG/1
1 CAPSULE, DELAYED RELEASE ORAL
Qty: 0 | Refills: 0 | DISCHARGE

## 2022-11-16 RX ORDER — HEPARIN SODIUM 5000 [USP'U]/ML
5000 INJECTION INTRAVENOUS; SUBCUTANEOUS EVERY 8 HOURS
Refills: 0 | Status: DISCONTINUED | OUTPATIENT
Start: 2022-11-16 | End: 2022-11-18

## 2022-11-16 RX ORDER — MORPHINE SULFATE 50 MG/1
4 CAPSULE, EXTENDED RELEASE ORAL ONCE
Refills: 0 | Status: DISCONTINUED | OUTPATIENT
Start: 2022-11-16 | End: 2022-11-16

## 2022-11-16 RX ORDER — OXYCODONE HYDROCHLORIDE 5 MG/1
5 TABLET ORAL EVERY 4 HOURS
Refills: 0 | Status: DISCONTINUED | OUTPATIENT
Start: 2022-11-16 | End: 2022-11-18

## 2022-11-16 RX ORDER — PANTOPRAZOLE SODIUM 20 MG/1
40 TABLET, DELAYED RELEASE ORAL
Refills: 0 | Status: DISCONTINUED | OUTPATIENT
Start: 2022-11-16 | End: 2022-11-18

## 2022-11-16 RX ORDER — SODIUM CHLORIDE 9 MG/ML
1000 INJECTION, SOLUTION INTRAVENOUS
Refills: 0 | Status: DISCONTINUED | OUTPATIENT
Start: 2022-11-16 | End: 2022-11-18

## 2022-11-16 RX ORDER — ONDANSETRON 8 MG/1
4 TABLET, FILM COATED ORAL ONCE
Refills: 0 | Status: COMPLETED | OUTPATIENT
Start: 2022-11-16 | End: 2022-11-16

## 2022-11-16 RX ORDER — HYDROMORPHONE HYDROCHLORIDE 2 MG/ML
0.25 INJECTION INTRAMUSCULAR; INTRAVENOUS; SUBCUTANEOUS EVERY 4 HOURS
Refills: 0 | Status: DISCONTINUED | OUTPATIENT
Start: 2022-11-16 | End: 2022-11-18

## 2022-11-16 RX ORDER — ACETAMINOPHEN 500 MG
1000 TABLET ORAL EVERY 6 HOURS
Refills: 0 | Status: COMPLETED | OUTPATIENT
Start: 2022-11-16 | End: 2022-11-17

## 2022-11-16 RX ORDER — OXYCODONE HYDROCHLORIDE 5 MG/1
2.5 TABLET ORAL EVERY 4 HOURS
Refills: 0 | Status: DISCONTINUED | OUTPATIENT
Start: 2022-11-16 | End: 2022-11-17

## 2022-11-16 RX ORDER — ACETAMINOPHEN 500 MG
1000 TABLET ORAL ONCE
Refills: 0 | Status: COMPLETED | OUTPATIENT
Start: 2022-11-16 | End: 2022-11-16

## 2022-11-16 RX ORDER — CEFOTETAN DISODIUM 1 G
2 VIAL (EA) INJECTION EVERY 12 HOURS
Refills: 0 | Status: DISCONTINUED | OUTPATIENT
Start: 2022-11-17 | End: 2022-11-17

## 2022-11-16 RX ORDER — CEFOTETAN DISODIUM 1 G
2 VIAL (EA) INJECTION ONCE
Refills: 0 | Status: COMPLETED | OUTPATIENT
Start: 2022-11-16 | End: 2022-11-16

## 2022-11-16 RX ORDER — CEFOTETAN DISODIUM 1 G
VIAL (EA) INJECTION
Refills: 0 | Status: DISCONTINUED | OUTPATIENT
Start: 2022-11-16 | End: 2022-11-17

## 2022-11-16 RX ADMIN — SODIUM CHLORIDE 1000 MILLILITER(S): 9 INJECTION INTRAMUSCULAR; INTRAVENOUS; SUBCUTANEOUS at 10:02

## 2022-11-16 RX ADMIN — Medication 400 MILLIGRAM(S): at 18:30

## 2022-11-16 RX ADMIN — MORPHINE SULFATE 4 MILLIGRAM(S): 50 CAPSULE, EXTENDED RELEASE ORAL at 14:04

## 2022-11-16 RX ADMIN — HEPARIN SODIUM 5000 UNIT(S): 5000 INJECTION INTRAVENOUS; SUBCUTANEOUS at 21:39

## 2022-11-16 RX ADMIN — SODIUM CHLORIDE 100 MILLILITER(S): 9 INJECTION, SOLUTION INTRAVENOUS at 21:32

## 2022-11-16 RX ADMIN — ONDANSETRON 4 MILLIGRAM(S): 8 TABLET, FILM COATED ORAL at 10:02

## 2022-11-16 RX ADMIN — Medication 100 GRAM(S): at 21:32

## 2022-11-16 RX ADMIN — Medication 400 MILLIGRAM(S): at 10:02

## 2022-11-16 RX ADMIN — MORPHINE SULFATE 4 MILLIGRAM(S): 50 CAPSULE, EXTENDED RELEASE ORAL at 10:03

## 2022-11-16 NOTE — H&P ADULT - NSHPPHYSICALEXAM_GEN_ALL_CORE
PHYSICAL EXAM: ***  General: NAD, Lying in bed comfortably  Neuro: A+Ox3  HEENT: NC/AT, EOMI  Neck: Soft, supple  Cardio: RRR  Resp: Good effort, CTA b/l  Thorax: No chest wall tenderness  Breast: No lesions/masses, no drainage  GI/Abd: Soft, RUQ moderate tenderness and guarding, no masses palpated  Vascular: All 4 extremities warm.  Skin: Intact, no breakdown  Musculoskeletal: All 4 extremities moving spontaneously, no limitations General: NAD, Lying in bed comfortably  Neuro: A+Ox3  HEENT: NC/AT, EOMI  Neck: Soft, supple  Cardio: RRR  Resp: Good effort, CTA b/l  Thorax: No chest wall tenderness  Breast: No lesions/masses, no drainage  GI/Abd: Soft, RUQ moderate tenderness and guarding, no masses palpated  Vascular: All 4 extremities warm.  Skin: Intact, no breakdown  Musculoskeletal: All 4 extremities moving spontaneously, no limitations

## 2022-11-16 NOTE — ED PROVIDER NOTE - PHYSICAL EXAMINATION
GENERAL: no acute distress, non-toxic appearing  HEENT: normal conjunctiva, oral mucosa moist  CARDIAC: regular rate and regular rhythm, normal S1 and S2, no appreciable murmurs  PULM: clear to ascultation bilaterally, no incr wob  GI: abdomen nondistended, soft, +RUQ tenderness, + murphys sign  : no CVA tenderness, no suprapubic tenderness  NEURO: alert and oriented x 3, normal speech, moving all extremities without lateralization  MSK: no visible deformities, no peripheral edema  SKIN: no visible rashes  PSYCH: appropriate mood and affect

## 2022-11-16 NOTE — ED ADULT NURSE NOTE - OBJECTIVE STATEMENT
pt received at intake rm 1 AAO x 3. pt with hx of gallstones. pt c/o epigastric pain, n/v, chills since last night. pt denies sob, chest pain, diarrhea, fevers, cough. respirations even and unlabored. 20g iv placed to right ac.

## 2022-11-16 NOTE — H&P ADULT - NSHPLABSRESULTS_GEN_ALL_CORE
< from: US Abdomen Upper Quadrant Right (11.16.22 @ 12:32) >    ACC: [id number] EXAM:  US ABDOMEN RT UPR QUADRANT                          PROCEDURE DATE:  11/16/2022          INTERPRETATION:  CLINICAL INFORMATION: Right upper quadrant pain and   tenderness.    COMPARISON: None available.    TECHNIQUE: Sonographyof the right upper quadrant.    FINDINGS:  Liver: Indeterminate right hepatic lesion, measuring 2.9 x 3.3 x 2.8 cm.  Bile ducts: Normal caliber. Common bile duct measures 4 mm.  Gallbladder: Cholelithiasis. No gallbladder wall thickening,   pericholecystic fluid, or focal tenderness.  Pancreas: Visualized portions are within normal limits.  Right kidney: 11.5 cm. No hydronephrosis.  Ascites: None.  IVC: Visualized portions are within normal limits.    IMPRESSION:  Cholelithiasis, without sonographic evidence of acute cholecystitis.      < end of copied text >

## 2022-11-16 NOTE — ED PROVIDER NOTE - PROGRESS NOTE DETAILS
Raghu, Med Tox Fellow: pt feeling better after meds, poss early iup seen given hcg <2500, OBGYN spoken to and they're coming to see pt.   Pt made aware of liver lesion seen on US, can f/u outpt to get MR given pain is improved.   Will f/u OB recs prior to dispo Raghu Med Tox Fellow: ob recs for pt to f/u in 48 hrs for rpt hcg pt has ob doc to see.   Pt is signif tender to RUQ still, surgery consulted for symptomatic cholelithiasis, [facility] has a bed if pt needs to stay for MR. Pt amenable to staying  Will f/u surg recs

## 2022-11-16 NOTE — CONSULT NOTE ADULT - SUBJECTIVE AND OBJECTIVE BOX
ESSIE FAITH  36y  Female [id number]    HPI:        Name of GYN Physician:     POB:   Pgyn: Denies fibroids, cysts, endometriosis, STI's, Abnormal pap smears       Vital Signs Last 24 Hrs  T(C): 36.9 (16 Nov 2022 14:00), Max: 37.1 (16 Nov 2022 10:12)  T(F): 98.5 (16 Nov 2022 14:00), Max: 98.8 (16 Nov 2022 10:12)  HR: 86 (16 Nov 2022 14:00) (79 - 86)  BP: 138/86 (16 Nov 2022 14:00) (119/72 - 138/86)  BP(mean): --  RR: 19 (16 Nov 2022 14:00) (19 - 20)  SpO2: 100% (16 Nov 2022 14:00) (100% - 100%)    Parameters below as of 16 Nov 2022 14:00  Patient On (Oxygen Delivery Method): room air        Physical Exam:   General: sitting comfortably in bed, NAD   CV: RR S1S2 no m/r/g  Lungs: CTA b/l, good air flow b/l   Abd: Soft, non-tender, non-distended.  Bowel sounds present.    :  No bleeding on pad.  External labia wnl.  Bimanual exam with no cervical motion tenderness, uterus wnl, adnexa non palpable b/l.  Cervix closed   Speculum Exam: No active bleeding from os.  Physiologic discharge.    Ext: non-tender b/l, no edema     LABS:                              12.3   15.48 )-----------( 431      ( 16 Nov 2022 10:00 )             39.5     11-16    136  |  101  |  10  ----------------------------<  120<H>  4.3   |  23  |  0.70    Ca    9.4      16 Nov 2022 10:00  Mg     1.70     11-16    TPro  7.3  /  Alb  4.1  /  TBili  <0.2  /  DBili  x   /  AST  21  /  ALT  15  /  AlkPhos  50  11-16    I&O's Detail    PT/INR - ( 16 Nov 2022 10:00 )   PT: 12.4 sec;   INR: 1.07 ratio         PTT - ( 16 Nov 2022 10:00 )  PTT:28.4 sec      RADIOLOGY & ADDITIONAL STUDIES:    ACC: [id number] EXAM:  US TRANSVAGINAL                          PROCEDURE DATE:  11/16/2022          INTERPRETATION:  CLINICAL INFORMATION: Abdominal pain.    LMP: October 9, 2022.  Estimated Gestational Age by LMP: 5 weeks, 3 days.    COMPARISON: August 11, 2022.    TECHNIQUE: Endovaginal pelvic sonogram.    FINDINGS:    Uterus:  Intrauterine gestational sac with mean sac diameter of 0.6 cm, consistent   with 5 weeks, 1 day gestation.    Right ovary: 3.5 x 2.3 x 2.2 cm. Within normal limits. Normal arterial   and venous waveforms.  Left ovary: 3.9 x 3.5 x 3.2 cm. Corpus luteum. Normal arterial and venous   waveforms.  Free fluid: Small volume pelvic ascites.    IMPRESSION:    Intrauterine gestational sac. No yolk sac or fetal pole is yet   identified, possibly due to early dates.    --- End of Report ---            JACQUELINE ARDON MD; Attending Radiologist  This document has been electronically signed. Nov 16 2022  1:43PM   ESSIE FAITH  36y  Female [id number]    HPI: 36 year old  LMP 10/9 (EGA 5w+3d) presents to the ED c/o epigastric pain for one day duration. States that the pain was a 10/10 and was associated with 4-5 episodes of emesis. Patient tried to take nexium at home; however, the epigastric pain persisted despite the nexium causing her to come to the ED for additional workup. In the ED, patient was subsequently found to have a positive bHCG. GYN consulted for PUL. On GYN evaluation patient denied any vaginal bleeding or abdominal pain. Denies lightheadedness, dizziness, chest pain, shortness of breath. Patient did not know she was pregnant prior to the ED as her period is baseline irregular. Patient states that this is not a desired pregnancy.      Name of GYN Physician: Haroldo    POB:  x1, TOP x4   Pgyn: +Endometriosis, +Abnormal paps Denies fibroids, cysts, STI's  PMHx: Denies  Meds: Denies  Surgeries:  Dx Lsc, LEEP  All: NKDA  Social: Denies toxic habits x3      Vital Signs Last 24 Hrs  T(C): 36.9 (2022 14:00), Max: 37.1 (2022 10:12)  T(F): 98.5 (2022 14:00), Max: 98.8 (2022 10:12)  HR: 86 (2022 14:00) (79 - 86)  BP: 138/86 (2022 14:00) (119/72 - 138/86)  BP(mean): --  RR: 19 (2022 14:00) (19 - 20)  SpO2: 100% (2022 14:00) (100% - 100%)    Parameters below as of 2022 14:00  Patient On (Oxygen Delivery Method): room air        Physical Exam:   General: sitting comfortably in bed, NAD   CV: RR S1S2 no m/r/g  Lungs: CTA b/l, good air flow b/l   Abd: Soft, non-tender, non-distended.  Bowel sounds present.    :  No bleeding on pad.  External labia wnl.  Bimanual exam with no cervical motion tenderness, uterus wnl, adnexa non palpable b/l.  Cervix closed   Speculum Exam: No active bleeding from os.  Physiologic discharge.    Ext: non-tender b/l, no edema     LABS:                              12.3   15.48 )-----------( 431      ( 2022 10:00 )             39.5         136  |  101  |  10  ----------------------------<  120<H>  4.3   |  23  |  0.70    Ca    9.4      2022 10:00  Mg     1.70         TPro  7.3  /  Alb  4.1  /  TBili  <0.2  /  DBili  x   /  AST  21  /  ALT  15  /  AlkPhos  50      I&O's Detail    PT/INR - ( 2022 10:00 )   PT: 12.4 sec;   INR: 1.07 ratio         PTT - ( 2022 10:00 )  PTT:28.4 sec      RADIOLOGY & ADDITIONAL STUDIES:    ACC: [id number] EXAM:  US TRANSVAGINAL                          PROCEDURE DATE:  2022          INTERPRETATION:  CLINICAL INFORMATION: Abdominal pain.    LMP: 2022.  Estimated Gestational Age by LMP: 5 weeks, 3 days.    COMPARISON: 2022.    TECHNIQUE: Endovaginal pelvic sonogram.    FINDINGS:    Uterus:  Intrauterine gestational sac with mean sac diameter of 0.6 cm, consistent   with 5 weeks, 1 day gestation.    Right ovary: 3.5 x 2.3 x 2.2 cm. Within normal limits. Normal arterial   and venous waveforms.  Left ovary: 3.9 x 3.5 x 3.2 cm. Corpus luteum. Normal arterial and venous   waveforms.  Free fluid: Small volume pelvic ascites.    IMPRESSION:    Intrauterine gestational sac. No yolk sac or fetal pole is yet   identified, possibly due to early dates.    --- End of Report ---            JACQUELINE ARDON MD; Attending Radiologist  This document has been electronically signed. 2022  1:43PM

## 2022-11-16 NOTE — H&P ADULT - ATTENDING COMMENTS
Symptomatic cholelithiasis  a.  Admit to B surgery / LEISA MCCLELLAND  b.  Keep NPO at this time  c.  Continue IVF resuscitation  d.  Plan for laparoscopic cholecystectomy

## 2022-11-16 NOTE — H&P ADULT - ASSESSMENT
36 years old female h/o biliary colic, GERD, PSH of diagnostic laparoscopy for endometriosis w/ suspicious of acute cholecystitis. She was found to be HCG (+) with pregnancy 5 weeks.       Plan  - admit to [facility] under Dr. Stephens, floor bed  - booked and consented for lap cholecystectomy, possible open for tomorrow  - pain control prn  - NPO  - IVF   - abx, cefotetan   - f/u gyn recs  - plan discussed with Rafiq Schmid PGY-2  B team Surgery  [id number]  36 years old female h/o biliary colic, GERD, PSH of diagnostic laparoscopy for endometriosis w/ suspicious of acute cholecystitis. She was found to be HCG (+) with pregnancy 5 weeks.       Plan  - admit to [facility] under Dr. Stephens, floor bed  - booked and consented for lap cholecystectomy, possible open for tomorrow, patient understands she is pregnant for 5 weeks and still wants surgery asap.  - pain control prn  - NPO  - IVF   - abx, cefotetan   - f/u gyn recs  - plan discussed with Rafiq Schmid PGY-2  B team Surgery  [id number]

## 2022-11-16 NOTE — ED PROVIDER NOTE - OBJECTIVE STATEMENT
36F PMH gallstones, and endometriosis s/p laparoscopy, presents to the ED with RUQ abd pain since last night that's been constant associated with nausea and nbnb emesis. Tried taking 800mg ibuprofen last night without relief of sxs. Denies any fevers/chills, uri sxs, cough, chest pain, sob, palpitations, diarrhea, dark/bloody stools, urinary sxs, vaginal discharge/bleeding. LBM yesterday and normal, still passing gas.

## 2022-11-16 NOTE — CONSULT NOTE ADULT - ASSESSMENT
36 year old  LMP 10/9 (EGA 5w+3d) presents to the ED c/o epigastric pain for one day duration. In the ED, patient found to have a bHCG of 2112. Patient denied vaginal bleeding or abdominal pain. In the ED vital signs stable. Lab work unremarkable. Differentials include ectopic pregnancy vs IUP, difficult to assess at this time.    -patient to follow up in 48 hours for repeat b-HCG with her private OBGYN in [place]. If patient cannot get appointment, patient instructed to come to the ED  -Ectopic precautions reviewed with patient (vaginal bleeding, soaking through a pad an hour for more than 2 hours, abdominal pain refractory to motrin and tylenol).  -Discussed with patient importance of follow up for b-HCG given unknown pregnancy location at this time.  Patient expressed understanding.  All questions and concerns addressed to patient's apparent satisfaction.   -patient to be added to GYN b-HCG list for closer follow up.    -patient stable for d/c home from GYN perspective.  Primary managment per ED team.      discussed w Dr Ken Cespedes PGY2

## 2022-11-16 NOTE — ED PROVIDER NOTE - CLINICAL SUMMARY MEDICAL DECISION MAKING FREE TEXT BOX
Raghu Med Tox Fellow: concern for biliary vs pancreatitis, low suspicion for /intrathoracic etiology of pts sxs. Will do labs, US, fluids, analgesia. Reassess.

## 2022-11-16 NOTE — H&P ADULT - HISTORY OF PRESENT ILLNESS
36 years old female w/ biliary colic for 1 year more frequently recently, GERD, PSH of diagnostic laparoscopy for endometriosis presented with RUQ abd pain with nausea without vomiting for 1 day. Patient has her RUQ always resolves in 1-2 hours in the past, this time the pain is more severe and consistent.     In ER, patient is HDS, afebrile, abd exam moderate RUQ tenderness, WBC 15, Tbil(-), LFTs(-), US showed cholelithiasis. Also HCG screening in the ER showed patient is likely  pregnant for 5weeks.

## 2022-11-17 ENCOUNTER — TRANSCRIPTION ENCOUNTER (OUTPATIENT)
Age: 37
End: 2022-11-17

## 2022-11-17 ENCOUNTER — RESULT REVIEW (OUTPATIENT)
Age: 37
End: 2022-11-17

## 2022-11-17 LAB
ALBUMIN SERPL ELPH-MCNC: 3.4 G/DL — SIGNIFICANT CHANGE UP (ref 3.3–5)
ALP SERPL-CCNC: 39 U/L — LOW (ref 40–120)
ALT FLD-CCNC: 13 U/L — SIGNIFICANT CHANGE UP (ref 4–33)
ANION GAP SERPL CALC-SCNC: 9 MMOL/L — SIGNIFICANT CHANGE UP (ref 7–14)
APTT BLD: 26.1 SEC — LOW (ref 27–36.3)
AST SERPL-CCNC: 9 U/L — SIGNIFICANT CHANGE UP (ref 4–32)
BILIRUB SERPL-MCNC: <0.2 MG/DL — SIGNIFICANT CHANGE UP (ref 0.2–1.2)
BLD GP AB SCN SERPL QL: NEGATIVE — SIGNIFICANT CHANGE UP
BUN SERPL-MCNC: 8 MG/DL — SIGNIFICANT CHANGE UP (ref 7–23)
CALCIUM SERPL-MCNC: 8.7 MG/DL — SIGNIFICANT CHANGE UP (ref 8.4–10.5)
CHLORIDE SERPL-SCNC: 102 MMOL/L — SIGNIFICANT CHANGE UP (ref 98–107)
CO2 SERPL-SCNC: 23 MMOL/L — SIGNIFICANT CHANGE UP (ref 22–31)
CREAT SERPL-MCNC: 0.73 MG/DL — SIGNIFICANT CHANGE UP (ref 0.5–1.3)
EGFR: 108 ML/MIN/1.73M2 — SIGNIFICANT CHANGE UP
GLUCOSE SERPL-MCNC: 100 MG/DL — HIGH (ref 70–99)
HCT VFR BLD CALC: 33.2 % — LOW (ref 34.5–45)
HGB BLD-MCNC: 10.5 G/DL — LOW (ref 11.5–15.5)
INR BLD: 1.17 RATIO — HIGH (ref 0.88–1.16)
MAGNESIUM SERPL-MCNC: 1.6 MG/DL — SIGNIFICANT CHANGE UP (ref 1.6–2.6)
MCHC RBC-ENTMCNC: 26.3 PG — LOW (ref 27–34)
MCHC RBC-ENTMCNC: 31.6 GM/DL — LOW (ref 32–36)
MCV RBC AUTO: 83.2 FL — SIGNIFICANT CHANGE UP (ref 80–100)
NRBC # BLD: 0 /100 WBCS — SIGNIFICANT CHANGE UP (ref 0–0)
NRBC # FLD: 0 K/UL — SIGNIFICANT CHANGE UP (ref 0–0)
PHOSPHATE SERPL-MCNC: 2.3 MG/DL — LOW (ref 2.5–4.5)
PLATELET # BLD AUTO: 366 K/UL — SIGNIFICANT CHANGE UP (ref 150–400)
POTASSIUM SERPL-MCNC: 3.4 MMOL/L — LOW (ref 3.5–5.3)
POTASSIUM SERPL-SCNC: 3.4 MMOL/L — LOW (ref 3.5–5.3)
PROT SERPL-MCNC: 6.4 G/DL — SIGNIFICANT CHANGE UP (ref 6–8.3)
PROTHROM AB SERPL-ACNC: 13.6 SEC — HIGH (ref 10.5–13.4)
RBC # BLD: 3.99 M/UL — SIGNIFICANT CHANGE UP (ref 3.8–5.2)
RBC # FLD: 13.9 % — SIGNIFICANT CHANGE UP (ref 10.3–14.5)
RH IG SCN BLD-IMP: POSITIVE — SIGNIFICANT CHANGE UP
SODIUM SERPL-SCNC: 134 MMOL/L — LOW (ref 135–145)
WBC # BLD: 10.37 K/UL — SIGNIFICANT CHANGE UP (ref 3.8–10.5)
WBC # FLD AUTO: 10.37 K/UL — SIGNIFICANT CHANGE UP (ref 3.8–10.5)

## 2022-11-17 PROCEDURE — 88304 TISSUE EXAM BY PATHOLOGIST: CPT | Mod: 26

## 2022-11-17 PROCEDURE — 47562 LAPAROSCOPIC CHOLECYSTECTOMY: CPT | Mod: GC

## 2022-11-17 DEVICE — CLIP APPLIER COVIDIEN ENDOCLIP III 5MM: Type: IMPLANTABLE DEVICE | Status: FUNCTIONAL

## 2022-11-17 RX ORDER — HYDROMORPHONE HYDROCHLORIDE 2 MG/ML
0.5 INJECTION INTRAMUSCULAR; INTRAVENOUS; SUBCUTANEOUS ONCE
Refills: 0 | Status: DISCONTINUED | OUTPATIENT
Start: 2022-11-17 | End: 2022-11-17

## 2022-11-17 RX ORDER — ACETAMINOPHEN 500 MG
2 TABLET ORAL
Qty: 0 | Refills: 0 | DISCHARGE
Start: 2022-11-17

## 2022-11-17 RX ORDER — ACETAMINOPHEN 500 MG
650 TABLET ORAL EVERY 6 HOURS
Refills: 0 | Status: DISCONTINUED | OUTPATIENT
Start: 2022-11-17 | End: 2022-11-18

## 2022-11-17 RX ORDER — OXYCODONE HYDROCHLORIDE 5 MG/1
1 TABLET ORAL
Qty: 12 | Refills: 0
Start: 2022-11-17

## 2022-11-17 RX ADMIN — HYDROMORPHONE HYDROCHLORIDE 0.5 MILLIGRAM(S): 2 INJECTION INTRAMUSCULAR; INTRAVENOUS; SUBCUTANEOUS at 15:33

## 2022-11-17 RX ADMIN — OXYCODONE HYDROCHLORIDE 5 MILLIGRAM(S): 5 TABLET ORAL at 19:28

## 2022-11-17 RX ADMIN — Medication 650 MILLIGRAM(S): at 18:12

## 2022-11-17 RX ADMIN — HYDROMORPHONE HYDROCHLORIDE 0.5 MILLIGRAM(S): 2 INJECTION INTRAMUSCULAR; INTRAVENOUS; SUBCUTANEOUS at 15:03

## 2022-11-17 RX ADMIN — Medication 400 MILLIGRAM(S): at 05:42

## 2022-11-17 RX ADMIN — Medication 100 GRAM(S): at 09:19

## 2022-11-17 RX ADMIN — PANTOPRAZOLE SODIUM 40 MILLIGRAM(S): 20 TABLET, DELAYED RELEASE ORAL at 05:43

## 2022-11-17 RX ADMIN — HYDROMORPHONE HYDROCHLORIDE 0.25 MILLIGRAM(S): 2 INJECTION INTRAMUSCULAR; INTRAVENOUS; SUBCUTANEOUS at 22:41

## 2022-11-17 RX ADMIN — HEPARIN SODIUM 5000 UNIT(S): 5000 INJECTION INTRAVENOUS; SUBCUTANEOUS at 05:43

## 2022-11-17 RX ADMIN — SODIUM CHLORIDE 100 MILLILITER(S): 9 INJECTION, SOLUTION INTRAVENOUS at 14:30

## 2022-11-17 RX ADMIN — SODIUM CHLORIDE 100 MILLILITER(S): 9 INJECTION, SOLUTION INTRAVENOUS at 00:02

## 2022-11-17 RX ADMIN — HYDROMORPHONE HYDROCHLORIDE 0.25 MILLIGRAM(S): 2 INJECTION INTRAMUSCULAR; INTRAVENOUS; SUBCUTANEOUS at 23:40

## 2022-11-17 RX ADMIN — OXYCODONE HYDROCHLORIDE 5 MILLIGRAM(S): 5 TABLET ORAL at 18:58

## 2022-11-17 RX ADMIN — SODIUM CHLORIDE 100 MILLILITER(S): 9 INJECTION, SOLUTION INTRAVENOUS at 09:18

## 2022-11-17 RX ADMIN — SODIUM CHLORIDE 100 MILLILITER(S): 9 INJECTION, SOLUTION INTRAVENOUS at 18:12

## 2022-11-17 RX ADMIN — Medication 650 MILLIGRAM(S): at 18:42

## 2022-11-17 RX ADMIN — HEPARIN SODIUM 5000 UNIT(S): 5000 INJECTION INTRAVENOUS; SUBCUTANEOUS at 22:42

## 2022-11-17 RX ADMIN — Medication 400 MILLIGRAM(S): at 00:03

## 2022-11-17 NOTE — DISCHARGE NOTE PROVIDER - NSDCCPCAREPLAN_GEN_ALL_CORE_FT
PRINCIPAL DISCHARGE DIAGNOSIS  Diagnosis: Cholelithiases  Assessment and Plan of Treatment: DIET: You may resume your regular diet.  BATHING: Please do not submerge wound underwater for one week. You may shower and/or sponge bathe after 48 hours.  ACTIVITY: No heavy lifting or straining for 2 weeks. Otherwise, you may return to your usual level of physical activity. If you are taking narcotic pain medication (such as Oxycodone) DO NOT drive or make important decisions.  NOTIFY YOUR SURGEON IF: You have any bleeding that does not stop, any pus draining from your wound(s), any fever (over 100.4) or chills, persistent nausea/vomiting, persistent diarrhea, or if your pain is not controlled on your discharge pain medications.  WOUND CARE: Over your incisions you have surgical glue. Please keep in place, do not remove. Please keep incisions clean and dry. Please do not scrub or rub incisions. Do not use lotion or powder on incisions.

## 2022-11-17 NOTE — BRIEF OPERATIVE NOTE - OPERATION/FINDINGS
Gallbladder inflamed.   Stone impacted in cystic duct, cleared.   Rest of laparoscopic cholecystectomy performed in the standard fashion.

## 2022-11-17 NOTE — DISCHARGE NOTE PROVIDER - CARE PROVIDER_API CALL
Benjamin King)  Surgery; Surgical Critical Care  [address], [address]  [place] [address]  Phone: [phone]  Fax: [phone]  Follow Up Time: 2 weeks

## 2022-11-17 NOTE — BRIEF OPERATIVE NOTE - NSICDXBRIEFPREOP_GEN_ALL_CORE_FT
PRE-OP DIAGNOSIS:  Acute cholecystitis 17-Nov-2022 13:57:37  Jose Angel Guzman   Complex Repair And Flap Additional Text (Will Appearing After The Standard Complex Repair Text): The complex repair was not sufficient to completely close the primary defect. The remaining additional defect was repaired with the flap mentioned below.

## 2022-11-17 NOTE — DISCHARGE NOTE PROVIDER - NSDCFUADDINST_GEN_ALL_CORE_FT
WOUND CARE:  Please keep incisions clean and dry. Please do not Scrub or rub incisions. Do not use lotion or powder on incisions.   BATHING: You may shower and/or sponge bathe. You may use warm soapy water in the shower and rinse, pat dry.  ACTIVITY: No heavy lifting or straining. Otherwise, you may return to your usual level of physical activity. If you are taking narcotic pain medication DO NOT drive a car, operate machinery or make important decisions.  DIET: Return to your usual diet.  NOTIFY YOUR SURGEON IF YOU HAVE: any bleeding that does not stop, any pus draining from your wound(s), any fever (over 100.4 F) persistent nausea/vomiting, or if your pain is not controlled on your discharge pain medications, unable to urinate.  Please follow up with your primary care physician in one week regarding your hospitalization, bring copies of your discharge paperwork.  Please follow up with your surgeon, Dr. King as an outpatient, please call to schedule appointment

## 2022-11-17 NOTE — DISCHARGE NOTE PROVIDER - NSDCMRMEDTOKEN_GEN_ALL_CORE_FT
acetaminophen 325 mg oral tablet: 2 tab(s) orally every 6 hours  NexIUM 24HR 20 mg oral delayed release capsule: 1 cap(s) orally once a day  oxyCODONE 5 mg oral tablet: 1 tab(s) orally every 6 hours, As Needed -Severe Pain (7 - 10) MDD:4 tabs

## 2022-11-17 NOTE — DISCHARGE NOTE PROVIDER - NSDCACTIVITY_GEN_ALL_CORE
No restrictions No restrictions/Showering allowed/Stairs allowed/Walking - Indoors allowed/No heavy lifting/straining/Walking - Outdoors allowed/Follow Instructions Provided by your Surgical Team

## 2022-11-17 NOTE — DISCHARGE NOTE PROVIDER - HOSPITAL COURSE
36 years old female w/ biliary colic for 1 year more frequently recently, GERD, PSH of diagnostic laparoscopy for endometriosis presented with RUQ abd pain with nausea without vomiting for 1 day. Patient has her RUQ always resolves in 1-2 hours in the past, this time the pain is more severe and consistent.     In ER, patient is HDS, afebrile, abd exam moderate RUQ tenderness, WBC 15, Tbil(-), LFTs(-), US showed presence of gallstones. Also HCG screening in the ER showed patient is likely  pregnant for 5weeks.     She was taken to the Operating Theatre for laparoscopic cholecystectomy, found to have a stone impacted in the cystic duct. The patient tolerated the procedure well without complications. The patient was then extubated to PACU, and then deemed stable for transfer to a floor unit. On the floor, the patient's pain was well controlled, tolerating a regular diet, ambulating, and voiding spontaneously. The patient was then discharged to home with instructions to follow up with Dr. King in 2 weeks. 36 years old female w/ biliary colic for 1 year more frequently recently, GERD, PSH of diagnostic laparoscopy for endometriosis presented with RUQ abd pain with nausea without vomiting for 1 day. Patient has her RUQ always resolves in 1-2 hours in the past, this time the pain is more severe and consistent.     In ER, patient is HDS, afebrile, abd exam moderate RUQ tenderness, WBC 15, Tbil(-), LFTs(-), US showed presence of gallstones. Also HCG screening in the ER showed patient is likely  pregnant for 5weeks. OB consulted.    She was taken to the Operating Theatre for laparoscopic cholecystectomy, found to have a stone impacted in the cystic duct. The patient tolerated the procedure well without complications. The patient was then extubated to PACU, and then deemed stable for transfer to a floor unit. On the floor, the patient's pain was well controlled, tolerating a regular diet, ambulating, and voiding spontaneously. The patient was then discharged to home with instructions to follow up with Dr. King in 2 weeks. 36 years old female w/ biliary colic for 1 year more frequently recently, GERD, PSH of diagnostic laparoscopy for endometriosis presented with RUQ abd pain with nausea without vomiting for 1 day. Patient has her RUQ always resolves in 1-2 hours in the past, this time the pain is more severe and consistent.     In ER, patient is HDS, afebrile, abd exam moderate RUQ tenderness, WBC 15, Tbil(-), LFTs(-), US showed presence of gallstones. Also HCG screening in the ER showed patient is likely  pregnant for 5weeks. OB consulted and an ultrasound was performed confirming an intrauterine pregnancy. B Hcg checked and patient will need to follow up with OB as an outpatient    She was taken to the Operating Theatre for laparoscopic cholecystectomy, found to have a stone impacted in the cystic duct. The patient tolerated the procedure well without complications. The patient was then extubated to PACU, and then deemed stable for transfer to a floor unit. On the floor, the patient's pain was well controlled, tolerating a regular diet, ambulating, and voiding spontaneously. The patient was then discharged to home with instructions to follow up with Dr. King in 2 weeks.

## 2022-11-17 NOTE — PROGRESS NOTE ADULT - SUBJECTIVE AND OBJECTIVE BOX
B TEAM SURGERY PROGRESS NOTE    Overnight events: No acute events overnight     Vital Signs Last 24 Hrs  T(C): 36.7 (17 Nov 2022 09:25), Max: 36.9 (16 Nov 2022 14:00)  T(F): 98.1 (17 Nov 2022 09:25), Max: 98.5 (16 Nov 2022 14:00)  HR: 76 (17 Nov 2022 09:25) (72 - 86)  BP: 104/58 (17 Nov 2022 09:25) (102/62 - 138/86)  BP(mean): --  RR: 18 (17 Nov 2022 09:25) (16 - 19)  SpO2: 100% (17 Nov 2022 09:25) (99% - 100%)    Parameters below as of 17 Nov 2022 09:25  Patient On (Oxygen Delivery Method): room air        SUBJECTIVE: Pt seen and examined at bedside. Reports improvement in pain, awaiting OR today. Denies chest pain, SOB, fever/chills, nausea or vomiting       General Appearance: Appears well, NAD  Neck: Supple  Chest: Equal expansion bilaterally, equal breath sounds  CV: Pulse regular presently  Abdomen: Soft, non distended, tender in RUQ, + Sanabria's. No reobund or guarding   Extremities: Grossly symmetric, SCD's in place     I&O's Summary    16 Nov 2022 07:01  -  17 Nov 2022 07:00  --------------------------------------------------------  IN: 900 mL / OUT: 400 mL / NET: 500 mL    17 Nov 2022 07:01  -  17 Nov 2022 11:18  --------------------------------------------------------  IN: 0 mL / OUT: 300 mL / NET: -300 mL      I&O's Detail    16 Nov 2022 07:01  -  17 Nov 2022 07:00  --------------------------------------------------------  IN:    IV PiggyBack: 200 mL    Lactated Ringers: 700 mL  Total IN: 900 mL    OUT:    Voided (mL): 400 mL  Total OUT: 400 mL    Total NET: 500 mL      17 Nov 2022 07:01  -  17 Nov 2022 11:18  --------------------------------------------------------  IN:  Total IN: 0 mL    OUT:    Voided (mL): 300 mL  Total OUT: 300 mL    Total NET: -300 mL          MEDICATIONS  (STANDING):  acetaminophen   IVPB .. 1000 milliGRAM(s) IV Intermittent every 6 hours  cefoTEtan  IVPB 2 Gram(s) IV Intermittent every 12 hours  cefoTEtan  IVPB      heparin   Injectable 5000 Unit(s) SubCutaneous every 8 hours  lactated ringers. 1000 milliLiter(s) (100 mL/Hr) IV Continuous <Continuous>  pantoprazole    Tablet 40 milliGRAM(s) Oral before breakfast    MEDICATIONS  (PRN):  HYDROmorphone  Injectable 0.25 milliGRAM(s) IV Push every 4 hours PRN breakthrough  oxyCODONE    IR 2.5 milliGRAM(s) Oral every 4 hours PRN Moderate Pain (4 - 6)  oxyCODONE    IR 5 milliGRAM(s) Oral every 4 hours PRN Severe Pain (7 - 10)      LABS:                        10.5   10.37 )-----------( 366      ( 17 Nov 2022 06:21 )             33.2     11-17    134<L>  |  102  |  8   ----------------------------<  100<H>  3.4<L>   |  23  |  0.73    Ca    8.7      17 Nov 2022 06:21  Phos  2.3     11-17  Mg     1.60     11-17    TPro  6.4  /  Alb  3.4  /  TBili  <0.2  /  DBili  x   /  AST  9   /  ALT  13  /  AlkPhos  39<L>  11-17    PT/INR - ( 17 Nov 2022 06:21 )   PT: 13.6 sec;   INR: 1.17 ratio         PTT - ( 17 Nov 2022 06:21 )  PTT:26.1 sec      RADIOLOGY & ADDITIONAL STUDIES:

## 2022-11-17 NOTE — DISCHARGE NOTE PROVIDER - NSDCCPGOAL_GEN_ALL_CORE_FT
Behavioral Health Nurse, Lajuan Kussmaul RN discussed with this CM her concern of patient reporting that \"patient feels like her baby doesn't like her because baby seems to be quiet for everyone else but always cries for her. .. states the baby starts crying and she can't get the baby to stop so patient starts crying and then becomes frustrated and stated has \"started to shake her 3month old infant. \" Patient stated \"that is's like someone else is in control of her brain/actions. Patient communicated that patient is \"overwhelmed with changes in her life and that she doesn't feel like herself and that her depression is worse now than prior to her pregnancy, reports patient is afraid she is going to harm self. \"     faxed: [facility] @ [phone]. Phone: [phone]. Fax successful for CPS report.
CM was requested to supply patient with information on the following:    [facility] [place] @ [address] [place] [address]  Phone: [phone]  Post Delivery resources, Post-Partum Depression, Infant Care Education and Material Assistance ie. . clothing, HEAP, PIPP, cereal, baby food, formula, diapers and sleep sacks, car seats.
To get better and follow your care plan as instructed.

## 2022-11-18 ENCOUNTER — TRANSCRIPTION ENCOUNTER (OUTPATIENT)
Age: 37
End: 2022-11-18

## 2022-11-18 VITALS
TEMPERATURE: 99 F | DIASTOLIC BLOOD PRESSURE: 74 MMHG | HEART RATE: 80 BPM | OXYGEN SATURATION: 99 % | SYSTOLIC BLOOD PRESSURE: 121 MMHG | RESPIRATION RATE: 18 BRPM

## 2022-11-18 LAB
ALBUMIN SERPL ELPH-MCNC: 3.5 G/DL — SIGNIFICANT CHANGE UP (ref 3.3–5)
ALP SERPL-CCNC: 44 U/L — SIGNIFICANT CHANGE UP (ref 40–120)
ALT FLD-CCNC: 17 U/L — SIGNIFICANT CHANGE UP (ref 4–33)
ANION GAP SERPL CALC-SCNC: 13 MMOL/L — SIGNIFICANT CHANGE UP (ref 7–14)
AST SERPL-CCNC: 21 U/L — SIGNIFICANT CHANGE UP (ref 4–32)
BILIRUB SERPL-MCNC: <0.2 MG/DL — SIGNIFICANT CHANGE UP (ref 0.2–1.2)
BUN SERPL-MCNC: 6 MG/DL — LOW (ref 7–23)
CALCIUM SERPL-MCNC: 9.1 MG/DL — SIGNIFICANT CHANGE UP (ref 8.4–10.5)
CHLORIDE SERPL-SCNC: 99 MMOL/L — SIGNIFICANT CHANGE UP (ref 98–107)
CO2 SERPL-SCNC: 21 MMOL/L — LOW (ref 22–31)
CREAT SERPL-MCNC: 0.69 MG/DL — SIGNIFICANT CHANGE UP (ref 0.5–1.3)
EGFR: 114 ML/MIN/1.73M2 — SIGNIFICANT CHANGE UP
GLUCOSE SERPL-MCNC: 118 MG/DL — HIGH (ref 70–99)
HCG SERPL-ACNC: 3775 MIU/ML — SIGNIFICANT CHANGE UP
HCT VFR BLD CALC: 36.3 % — SIGNIFICANT CHANGE UP (ref 34.5–45)
HGB BLD-MCNC: 11.7 G/DL — SIGNIFICANT CHANGE UP (ref 11.5–15.5)
MAGNESIUM SERPL-MCNC: 1.7 MG/DL — SIGNIFICANT CHANGE UP (ref 1.6–2.6)
MCHC RBC-ENTMCNC: 26.7 PG — LOW (ref 27–34)
MCHC RBC-ENTMCNC: 32.2 GM/DL — SIGNIFICANT CHANGE UP (ref 32–36)
MCV RBC AUTO: 82.7 FL — SIGNIFICANT CHANGE UP (ref 80–100)
NRBC # BLD: 0 /100 WBCS — SIGNIFICANT CHANGE UP (ref 0–0)
NRBC # FLD: 0 K/UL — SIGNIFICANT CHANGE UP (ref 0–0)
PHOSPHATE SERPL-MCNC: 2.6 MG/DL — SIGNIFICANT CHANGE UP (ref 2.5–4.5)
PLATELET # BLD AUTO: 407 K/UL — HIGH (ref 150–400)
POTASSIUM SERPL-MCNC: 3.9 MMOL/L — SIGNIFICANT CHANGE UP (ref 3.5–5.3)
POTASSIUM SERPL-SCNC: 3.9 MMOL/L — SIGNIFICANT CHANGE UP (ref 3.5–5.3)
PROT SERPL-MCNC: 6.6 G/DL — SIGNIFICANT CHANGE UP (ref 6–8.3)
RBC # BLD: 4.39 M/UL — SIGNIFICANT CHANGE UP (ref 3.8–5.2)
RBC # FLD: 13.7 % — SIGNIFICANT CHANGE UP (ref 10.3–14.5)
SODIUM SERPL-SCNC: 133 MMOL/L — LOW (ref 135–145)
WBC # BLD: 16.04 K/UL — HIGH (ref 3.8–10.5)
WBC # FLD AUTO: 16.04 K/UL — HIGH (ref 3.8–10.5)

## 2022-11-18 PROCEDURE — 76817 TRANSVAGINAL US OBSTETRIC: CPT | Mod: 26

## 2022-11-18 RX ORDER — OXYCODONE HYDROCHLORIDE 5 MG/1
2.5 TABLET ORAL EVERY 4 HOURS
Refills: 0 | Status: DISCONTINUED | OUTPATIENT
Start: 2022-11-18 | End: 2022-11-18

## 2022-11-18 RX ADMIN — OXYCODONE HYDROCHLORIDE 5 MILLIGRAM(S): 5 TABLET ORAL at 11:36

## 2022-11-18 RX ADMIN — OXYCODONE HYDROCHLORIDE 5 MILLIGRAM(S): 5 TABLET ORAL at 10:36

## 2022-11-18 RX ADMIN — Medication 650 MILLIGRAM(S): at 06:08

## 2022-11-18 RX ADMIN — Medication 650 MILLIGRAM(S): at 17:56

## 2022-11-18 RX ADMIN — PANTOPRAZOLE SODIUM 40 MILLIGRAM(S): 20 TABLET, DELAYED RELEASE ORAL at 06:08

## 2022-11-18 RX ADMIN — Medication 650 MILLIGRAM(S): at 12:58

## 2022-11-18 RX ADMIN — HEPARIN SODIUM 5000 UNIT(S): 5000 INJECTION INTRAVENOUS; SUBCUTANEOUS at 06:07

## 2022-11-18 RX ADMIN — Medication 650 MILLIGRAM(S): at 07:13

## 2022-11-18 RX ADMIN — HEPARIN SODIUM 5000 UNIT(S): 5000 INJECTION INTRAVENOUS; SUBCUTANEOUS at 13:05

## 2022-11-18 NOTE — PROGRESS NOTE ADULT - ASSESSMENT
36 year old female h/o biliary colic, GERD, PSH of diagnostic laparoscopy for endometriosis w/ suspicious of acute cholecystitis. She was found to be HCG (+) with pregnancy 5 weeks. Pt is now several hours s/p laparoscopic cholecystectomy.    PLAN:  -Diet: Regular  -Appreciate OBGYN recs   -Discussed with patient outpatient with PMD given US findings of hepatic lesion will require followup  -DVT ppx  -Dispo to home     B Team Surgery  pager: [phone].
36 year old female h/o biliary colic, GERD, PSH of diagnostic laparoscopy for endometriosis w/ suspicious of acute cholecystitis. She was found to be HCG (+) with pregnancy 5 weeks.       Plan:  -NPO/IVF   - Add on for OR today for lap joe   - Pain control PRN  - Continue abx, cefotetan   - OBGYN following, appreciate recs: outpatient follow up  - Discussed with patient outpatient with PMD given US findings of hepatic lesion   - DVT ppx  -Dispo: Possibly discharge later today pending OR    B team surgery #[id number]  Please page with any questions or concerns

## 2022-11-18 NOTE — DISCHARGE NOTE NURSING/CASE MANAGEMENT/SOCIAL WORK - PATIENT PORTAL LINK FT
You can access the FollowMyHealth Patient Portal offered by [facility] by registering at the following website: [email or web address]. By joining [facility]’s FollowMyHealth portal, you will also be able to view your health information using other applications (apps) compatible with our system.

## 2022-11-18 NOTE — PROGRESS NOTE ADULT - SUBJECTIVE AND OBJECTIVE BOX
Surgery [facility] B Team Daily Progress Note   ESSIE FAITH | [id number]  --------------------------------------------------------------------------------------------------------------------  SUBJECTIVE / 24H EVENTS  Patient seen and examined on morning rounds. No acute events overnight.  --------------------------------------------------------------------------------------------------------------------  OBJECTIVE:    VITAL SIGNS:  T(C): 36.6 (11-18-22 @ 09:40), Max: 37.2 (11-17-22 @ 11:25)  HR: 85 (11-18-22 @ 09:40) (72 - 92)  BP: 104/54 (11-18-22 @ 09:40) (104/54 - 128/78)  RR: 20 (11-18-22 @ 09:40) (16 - 20)  SpO2: 98% (11-18-22 @ 09:40) (97% - 100%)  Daily     Daily       PHYSICAL EXAM:  Gen: NAD  LS: Respirations unlabored.   Card: RRR.   GI: Soft. Nontender. Nondistended. Incision c/d/i  Ext: Warm, well perfused      11-17-22 @ 07:01  -  11-18-22 @ 07:00  --------------------------------------------------------  IN:    IV PiggyBack: 100 mL    Lactated Ringers: 750 mL    Oral Fluid: 350 mL  Total IN: 1200 mL    OUT:    Voided (mL): 2600 mL  Total OUT: 2600 mL    Total NET: -1400 mL          LAB VALUES:  11-18    133<L>  |  99  |  6<L>  ----------------------------<  118<H>  3.9   |  21<L>  |  0.69    Ca    9.1      18 Nov 2022 06:20  Phos  2.6     11-18  Mg     1.70     11-18    TPro  6.6  /  Alb  3.5  /  TBili  <0.2  /  DBili  x   /  AST  21  /  ALT  17  /  AlkPhos  44  11-18                               11.7   16.04 )-----------( 407      ( 18 Nov 2022 06:20 )             36.3     LIVER FUNCTIONS - ( 18 Nov 2022 06:20 )  Alb: 3.5 g/dL / Pro: 6.6 g/dL / ALK PHOS: 44 U/L / ALT: 17 U/L / AST: 21 U/L / GGT: x           PT/INR - ( 17 Nov 2022 06:21 )   PT: 13.6 sec;   INR: 1.17 ratio         PTT - ( 17 Nov 2022 06:21 )  PTT:26.1 sec            MICROBIOLOGY:    No new microbiology data for review.     RADIOLOGY:    No new radiographic images for review.    MEDICATIONS  (STANDING):  acetaminophen     Tablet .. 650 milliGRAM(s) Oral every 6 hours  heparin   Injectable 5000 Unit(s) SubCutaneous every 8 hours  pantoprazole    Tablet 40 milliGRAM(s) Oral before breakfast    MEDICATIONS  (PRN):  oxyCODONE    IR 2.5 milliGRAM(s) Oral every 4 hours PRN Moderate Pain (4 - 6)  oxyCODONE    IR 5 milliGRAM(s) Oral every 4 hours PRN Severe Pain (7 - 10)

## 2022-11-18 NOTE — DISCHARGE NOTE NURSING/CASE MANAGEMENT/SOCIAL WORK - NSDCPNINST_GEN_ALL_CORE
Report to MD/ED for fever, drainage from incision site, redness, swelling, bleeding, pain that is not relieved w/ pain med. , inability to tolerate foods or liquids or urinate.

## 2022-11-18 NOTE — CHART NOTE - NSCHARTNOTEFT_GEN_A_CORE
Pt seen at bedside to discuss TVUS findings. TVUS demonstrating intrauterine pregnancy. Pt reports upper abdominal pain 2/2 lap joe. Pt denies pelvic pain or cramping, vaginal bleeding, fevers, chills, n/v, dizziness. Discussed TVUS findings with pt, questions answered at bedside to apparent pt satisfaction. Pt agreed to follow-up for routine OB care with Dr. Rogers. Pt cleared for discharged from OB/GYN perspective at this time.    < from: US Transvaginal, OB (11.18.22 @ 10:17) >    FINDINGS:  Uterus: Anteverted in orientation. A gestational sac is identified in the   endometrial cavity with mean sac diameter of 8 mm, corresponding to 5   weeks, 3 days. A normal yolk sac is identified on this exam. The   gestational sac is slightly low lying.    A trace subchorionic hematoma.    Right ovary: 2.8 cm x 2.1 cm x 2.5 cm. Within normal limits and similar   in appearance to exam of 8/11/2022. Normal arterial and venous waveforms.  Left ovary: 3.9 cm x 2.6 cm x 3.5 cm. Contains a small corpus luteal   cyst. Normal arterial and venous waveforms.    Fluid: Small amount of debris-containing fluid in the left adnexa.    IMPRESSION: The gestational sac is slightly low-lying and measures 5   weeks, 3 days. A yolk sac is identified on this exam. Close follow-up   with serial serum beta-hCG levels and/or ultrasound is advised to assess   for proper evolution of pregnancy.    --- End of Report ---    < end of copied text >      D/w Dr. Ken Rose PGY1
B Team (General Surgery) Post Op Check    SUBJECTIVE:  Pt seen and examined, and is resting comfortably in bed. No acute events in PACU. Pain is adequately controlled on current regimen. Pt has no complaints at this time. Pt reports mild episodes of nausea in PACU that have since resolved. Pt denies episodes of emesis. Pt denies CP, SOB, Changes in Vision, and Headaches. Pt expressed desire to stay inpatient overnight.    OBJECTIVE:  Vital Signs Last 24 Hrs  T(C): 37.2 (17 Nov 2022 20:07), Max: 37.2 (17 Nov 2022 11:25)  T(F): 98.9 (17 Nov 2022 20:07), Max: 99 (17 Nov 2022 11:25)  HR: 92 (17 Nov 2022 20:07) (72 - 92)  BP: 110/63 (17 Nov 2022 20:07) (104/58 - 128/78)  BP(mean): 89 (17 Nov 2022 16:00) (67 - 95)  RR: 20 (17 Nov 2022 20:07) (16 - 20)  SpO2: 99% (17 Nov 2022 20:07) (97% - 100%)    Parameters below as of 17 Nov 2022 20:07  Patient On (Oxygen Delivery Method): room air        I&O's Detail    16 Nov 2022 07:01  -  17 Nov 2022 07:00  --------------------------------------------------------  IN:    IV PiggyBack: 200 mL    Lactated Ringers: 700 mL  Total IN: 900 mL    OUT:    Voided (mL): 400 mL  Total OUT: 400 mL    Total NET: 500 mL      17 Nov 2022 07:01  -  18 Nov 2022 01:03  --------------------------------------------------------  IN:    IV PiggyBack: 100 mL    Lactated Ringers: 750 mL    Oral Fluid: 350 mL  Total IN: 1200 mL    OUT:    Voided (mL): 2000 mL  Total OUT: 2000 mL    Total NET: -800 mL        Exam:  GEN: A&Ox3, NAD  HEENT: atraumatic, normocephalic  CV: no JVD  RESP: no increased work of breathing, no use of accessory muscles  GI/ABD: soft, appropriately tender, mildly distended, no rebound or guarding, laparoscopic incisions are c/d/i  : deferred  EXTREMITIES: warm, pink, well-perfused                        10.5   10.37 )-----------( 366      ( 17 Nov 2022 06:21 )             33.2       11-17    134<L>  |  102  |  8   ----------------------------<  100<H>  3.4<L>   |  23  |  0.73    Ca    8.7      17 Nov 2022 06:21  Phos  2.3     11-17  Mg     1.60     11-17    TPro  6.4  /  Alb  3.4  /  TBili  <0.2  /  DBili  x   /  AST  9   /  ALT  13  /  AlkPhos  39<L>  11-17      PT/INR - ( 17 Nov 2022 06:21 )   PT: 13.6 sec;   INR: 1.17 ratio         PTT - ( 17 Nov 2022 06:21 )  PTT:26.1 sec    ASSESSMENT:  36 year old female h/o biliary colic, GERD, PSH of diagnostic laparoscopy for endometriosis w/ suspicious of acute cholecystitis. She was found to be HCG (+) with pregnancy 5 weeks. Pt is now several hours s/p laparoscopic cholecystectomy.    PLAN:  - Diet: Regular  - LR @ 100  - OBGYN following, appreciate recs: outpatient follow up  - Discussed with patient outpatient with PMD given US findings of hepatic lesion   - DVT ppx  -Dispo: Floor/Dispo Planning      B Team Surgery  pager: [phone]

## 2022-11-18 NOTE — DISCHARGE NOTE NURSING/CASE MANAGEMENT/SOCIAL WORK - NSDCPEFALRISK_GEN_ALL_CORE
For information on Fall & Injury Prevention, visit: [email or web address] OR  [email or web address] OR  https://www.cdc.gov/steadi/patient.html

## 2022-11-18 NOTE — PROGRESS NOTE ADULT - ATTENDING COMMENTS
The patient was seen and examined, chart and notes reviewed.  The current diagnosis, plan of care and alternatives have been discussed with the patient.  All questions have been answered and updates have been discussed.  The case was discussed with B team residents/PA's and medical students at morning B surgical rounds and throughout the course of the day.    Acute cholecystitis/Symptomatic cholelithiasis    a.  S/P laparoscopic cholecystectomy on postoperative day #1  b.  No complaints/issues overnight  c.  Remains afebrile, Vital signs are within normal  d.  Physical exam reveals anicteric sclera, abdomen with incisions which are clean, dry and intact.  The patient remains appropriately tender, nondistended  e.  Tolerating clears, advance to low fat diet/regular.  Wean IVF.  Continue chemical DVT prophylaxis  f.   Discharge planning    Gestational pregnancy  a,  HCG and TV ulktrasound results noted  b.  OBGYN input appreciated

## 2022-11-28 LAB — SURGICAL PATHOLOGY STUDY: SIGNIFICANT CHANGE UP

## 2022-11-30 ENCOUNTER — TRANSCRIPTION ENCOUNTER (OUTPATIENT)
Age: 37
End: 2022-11-30

## 2022-12-01 ENCOUNTER — APPOINTMENT (OUTPATIENT)
Dept: OBGYN | Facility: CLINIC | Age: 37
End: 2022-12-01

## 2023-05-16 NOTE — ED PROVIDER NOTE - NS ED ROS FT
How Severe Is Your Skin Lesion?: moderate Is This A New Presentation, Or A Follow-Up?: Skin Lesions +ruq pain/n/v

## 2023-07-05 ENCOUNTER — EMERGENCY (EMERGENCY)
Facility: HOSPITAL | Age: 38
LOS: 1 days | Discharge: HOME | End: 2023-07-05
Attending: EMERGENCY MEDICINE | Admitting: EMERGENCY MEDICINE
Payer: COMMERCIAL

## 2023-07-05 VITALS
SYSTOLIC BLOOD PRESSURE: 135 MMHG | TEMPERATURE: 99 F | HEART RATE: 82 BPM | OXYGEN SATURATION: 100 % | DIASTOLIC BLOOD PRESSURE: 62 MMHG | RESPIRATION RATE: 16 BRPM

## 2023-07-05 VITALS
RESPIRATION RATE: 16 BRPM | HEART RATE: 81 BPM | SYSTOLIC BLOOD PRESSURE: 108 MMHG | DIASTOLIC BLOOD PRESSURE: 64 MMHG | OXYGEN SATURATION: 100 %

## 2023-07-05 DIAGNOSIS — Z98.890 OTHER SPECIFIED POSTPROCEDURAL STATES: Chronic | ICD-10-CM

## 2023-07-05 PROCEDURE — 93010 ELECTROCARDIOGRAM REPORT: CPT

## 2023-07-05 PROCEDURE — 99284 EMERGENCY DEPT VISIT MOD MDM: CPT

## 2023-07-05 PROCEDURE — 71046 X-RAY EXAM CHEST 2 VIEWS: CPT | Mod: 26

## 2023-07-05 RX ORDER — SODIUM CHLORIDE 9 MG/ML
1000 INJECTION INTRAMUSCULAR; INTRAVENOUS; SUBCUTANEOUS ONCE
Refills: 0 | Status: COMPLETED | OUTPATIENT
Start: 2023-07-05 | End: 2023-07-05

## 2023-07-05 RX ORDER — IBUPROFEN 200 MG
600 TABLET ORAL ONCE
Refills: 0 | Status: COMPLETED | OUTPATIENT
Start: 2023-07-05 | End: 2023-07-05

## 2023-07-05 RX ORDER — ACETAMINOPHEN 500 MG
975 TABLET ORAL ONCE
Refills: 0 | Status: COMPLETED | OUTPATIENT
Start: 2023-07-05 | End: 2023-07-05

## 2023-07-05 RX ADMIN — Medication 600 MILLIGRAM(S): at 04:22

## 2023-07-05 RX ADMIN — SODIUM CHLORIDE 1000 MILLILITER(S): 9 INJECTION INTRAMUSCULAR; INTRAVENOUS; SUBCUTANEOUS at 02:40

## 2023-07-05 RX ADMIN — Medication 100 MILLIGRAM(S): at 02:40

## 2023-07-05 RX ADMIN — Medication 975 MILLIGRAM(S): at 04:22

## 2023-07-05 RX ADMIN — Medication 600 MILLIGRAM(S): at 03:52

## 2023-07-05 RX ADMIN — Medication 975 MILLIGRAM(S): at 03:52

## 2023-07-05 RX ADMIN — SODIUM CHLORIDE 1000 MILLILITER(S): 9 INJECTION INTRAMUSCULAR; INTRAVENOUS; SUBCUTANEOUS at 03:55

## 2023-07-05 NOTE — ED PROVIDER NOTE - CLINICAL SUMMARY MEDICAL DECISION MAKING FREE TEXT BOX
Patient is a 37 year-old-female with no reported PMH presents with viral syndrome. Reports that she has been having 2 weeks of productive cough, then about 2 days ago, started to feel unwell w/fatigue and body aches. Also started c/o mild chest pain and back pain, worsen w/coughing and lying down. 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 Denies any recent travel/hospitalization/surgery/hormone use.     Vitals: I have reviewed the patients vital signs  General: in hospital gown lying down, appears tired   HEENT: Atraumatic, normocephalic, airway patent  Eyes: EOMI, tracking appropriately  Neck: no tracheal deviation, no JVD  Chest/Lungs: symmetric chest rise, speaking in complete sentences, CTA BL   Heart: skin and extremities well perfused, regular rate and rhythm  Abdomen: soft, nontender and nondistended   Neuro: A+Ox3, ambulating without difficulty, CN II-XI intact  MSK: strength at baseline in all extremities, no muscle wasting or atrophy  Skin: no cyanosis, no jaundice, no new emergent lesions     Likely viral syndrome; will rule out pneumonia w/chest xray. Low suspicion for ACS/pneumothorax/pleural effusion given clinical presentation. Low suspicion for PE, PERC 0. Will give fluids and tessalon perles and obtain RVP.

## 2023-07-05 NOTE — ED PROVIDER NOTE - ATTENDING CONTRIBUTION TO CARE
I performed a face-to-face evaluation of the patient and performed a history and physical examination along with the resident or ACP, and/or medical student above.  I agree with the history and physical examination as documented by the resident or ACP, and/or medical student above.  Leal:  Vitals: I have reviewed the patients vital signs  General: in hospital gown lying down, appears tired   HEENT: Atraumatic, normocephalic, airway patent  Eyes: EOMI, tracking appropriately  Neck: no tracheal deviation, no JVD  Chest/Lungs: symmetric chest rise, speaking in complete sentences, CTA BL   Heart: skin and extremities well perfused, regular rate and rhythm  Abdomen: soft, nontender and nondistended   Neuro: A+Ox3, ambulating without difficulty, CN II-XI intact  MSK: strength at baseline in all extremities, no muscle wasting or atrophy  Skin: no cyanosis, no jaundice, no new emergent lesions

## 2023-07-05 NOTE — ED ADULT NURSE NOTE - OBJECTIVE STATEMENT
Pt received to room 4. Pt A&Ox4, ambulatory at baseline. Pt c/o cold symptoms x2 days. States her baby was recently sick. Pt endorsing productive cough causing chest discomfort. Also endorses weakness and body aches. Denies any fevers, n/v/d, abd pain. RR even and unlabored, pt satting 100% on RA. EKG performed, NSR noted. IV access established with 20G to R AC, fluids infusing as per orders. Pt swabbed. Safety measures in place. Pt stable upon exiting room

## 2023-07-05 NOTE — ED PROVIDER NOTE - PROGRESS NOTE DETAILS
Olson PGY3  CXR did not show pneumonia. Patient reassessed and reports feeling better but still having body aches. Will give another fluid bolus and ibuprofen/tylenol. Wesley PGY3   Patient reassessed and reports feeling better after 2nd fluid bolus. Prescription for tessalon perles sent to preferred pharmacy. Reviewed return precautions and instructed patient to follow up with PMD for continuation of care.

## 2023-07-05 NOTE — ED PROVIDER NOTE - NSFOLLOWUPINSTRUCTIONS_ED_ALL_ED_FT
You were seen in the emergency department for coughing and fatigue/body aches.   Your workup in the emergency department includes xray of chest, ECG and medications.   The presumed diagnosis is viral infection.   You can find the results of all the tests in this discharge packet.   Please follow up with your primary care doctor within 48 hours for continuation of care.     Return to the emergency department if you experience any new/concerning/worsening symptoms such as but not limited to: fever (>100.3F), intractable nausea, vomiting, chest pain, shortness of breath, difficulty breathing.     For pain, you may take:  1) Acetaminophen (Tylenol): 500mg every 6 hours as needed for pain   2) Ibuprofen (Advil/Motrin): 600mg every 6 hours as needed for pain     You are prescribed benzonatate (Tessalon Perles), take 100mg 2 times a day for the next 7 days for cough.

## 2023-07-05 NOTE — ED PROVIDER NOTE - PATIENT PORTAL LINK FT
You can access the FollowMyHealth Patient Portal offered by [facility] by registering at the following website: [email or web address]. By joining [employer]’s FollowMyHealth portal, you will also be able to view your health information using other applications (apps) compatible with our system.

## 2023-07-05 NOTE — ED ADULT TRIAGE NOTE - CHIEF COMPLAINT QUOTE
pt c/o 2 weeks productive cough, worsening today with fatigue and body aches. denies sick contacts, past medical history.

## 2023-07-05 NOTE — ED ADULT NURSE NOTE - NSFALLUNIVINTERV_ED_ALL_ED
Bed/Stretcher in lowest position, wheels locked, appropriate side rails in place/Call bell, personal items and telephone in reach/Instruct patient to call for assistance before getting out of bed/chair/stretcher/Non-slip footwear applied when patient is off stretcher/[place] to call system/Physically safe environment - no spills, clutter or unnecessary equipment/Purposeful proactive rounding/Room/bathroom lighting operational, light cord in reach

## 2024-02-06 NOTE — ED ADULT NURSE NOTE - CAS EDN DISCHARGE ASSESSMENT
Caller: Pearl Elizalde    Relationship: Self    Best call back number: [phone]    What medication are you requesting:     pravastatin (PRAVACHOL) 80 MG tablet       Have you had these symptoms before:    [] Yes  [x] No    Have you been treated for these symptoms before:   [] Yes  [x] No    If a prescription is needed, what is your preferred pharmacy and phone number: [id number] [place] [facility] [address] AT [facility] [address] [facility] - [phone] [facility] [phone] FX     Additional notes:  PLEASE CALL TO CONFIRM OR DISCUSS.    Alert and oriented to person, place and time

## 2024-03-06 NOTE — ED ADULT NURSE NOTE - CAS DISCH TRANSFER METHOD
If you are a smoker, it is important for your health to stop smoking. Please be aware that second hand smoke is also harmful.
Private car

## 2024-07-22 ENCOUNTER — EMERGENCY (EMERGENCY)
Facility: HOSPITAL | Age: 39
LOS: 1 days | Discharge: HOME | End: 2024-07-22
Admitting: STUDENT IN AN ORGANIZED HEALTH CARE EDUCATION/TRAINING PROGRAM
Payer: COMMERCIAL

## 2024-07-22 VITALS
TEMPERATURE: 98 F | SYSTOLIC BLOOD PRESSURE: 131 MMHG | HEART RATE: 74 BPM | OXYGEN SATURATION: 100 % | DIASTOLIC BLOOD PRESSURE: 84 MMHG | RESPIRATION RATE: 18 BRPM

## 2024-07-22 VITALS
TEMPERATURE: 98 F | DIASTOLIC BLOOD PRESSURE: 77 MMHG | WEIGHT: 250 LBS | OXYGEN SATURATION: 99 % | SYSTOLIC BLOOD PRESSURE: 135 MMHG | RESPIRATION RATE: 18 BRPM | HEIGHT: 68 IN | HEART RATE: 75 BPM

## 2024-07-22 DIAGNOSIS — Z98.890 OTHER SPECIFIED POSTPROCEDURAL STATES: Chronic | ICD-10-CM

## 2024-07-22 PROCEDURE — 99284 EMERGENCY DEPT VISIT MOD MDM: CPT

## 2024-07-22 PROCEDURE — 93010 ELECTROCARDIOGRAM REPORT: CPT

## 2024-07-22 RX ORDER — LIDOCAINE HCL 28 MG/G
2 GEL TOPICAL ONCE
Refills: 0 | Status: COMPLETED | OUTPATIENT
Start: 2024-07-22 | End: 2024-07-22

## 2024-07-22 RX ORDER — KETOROLAC TROMETHAMINE 30 MG/ML
1 INJECTION, SOLUTION INTRAMUSCULAR
Qty: 15 | Refills: 0
Start: 2024-07-22 | End: 2024-07-26

## 2024-07-22 RX ORDER — KETOROLAC TROMETHAMINE 30 MG/ML
60 INJECTION, SOLUTION INTRAMUSCULAR ONCE
Refills: 0 | Status: DISCONTINUED | OUTPATIENT
Start: 2024-07-22 | End: 2024-07-22

## 2024-07-22 RX ADMIN — KETOROLAC TROMETHAMINE 60 MILLIGRAM(S): 30 INJECTION, SOLUTION INTRAMUSCULAR at 19:40

## 2024-07-22 RX ADMIN — LIDOCAINE HCL 2 PATCH: 28 GEL TOPICAL at 19:56

## 2024-07-22 NOTE — ED PROVIDER NOTE - NS CPE EDP MUSC LUMBAR LOC
full ROM of the cervical, thoracic, lumbar and sacral spine, no obvious deformities noted, ambulating with steady gait. 5/5 strength in all extremities.

## 2024-07-22 NOTE — ED PROVIDER NOTE - CLINICAL SUMMARY MEDICAL DECISION MAKING FREE TEXT BOX
This is a 38-year-old female with no significant past medical history presented to the emergency room complaining of having left back pain left neck pain shoulder pain leg pain.   Lower back pain- will do pain control and recommend f/u with PMD. patient given return precautions, recommend to return if symptoms worsen, patient does feel better after receiving medications in the ED.
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
## 2024-07-22 NOTE — ED ADULT NURSE NOTE - NSFALLRISKFACTORS_ED_ALL_ED
No indicators present
Carina Rogers  GASTROENTEROLOGY  [address] [facility] [address]  [place] [address]  Phone: [phone]  Fax: [phone]  Follow Up Time:     Roshan Melendez  GASTROENTEROLOGY  [address] [place] [address]  Phone: [phone]  Fax: [phone]  Follow Up Time:     Luiz Borrero)  Gastroenterology; Internal Medicine  [address] [place] [address]  Phone: [phone]  Fax: [phone]  Follow Up Time:

## 2024-07-22 NOTE — ED PROVIDER NOTE - NSFOLLOWUPINSTRUCTIONS_ED_ALL_ED_FT
Rest, drink plenty of fluids.  Advance activity as tolerated.  Continue all previously prescribed medications as directed.  Follow up with your primary care physician in 48-72 hours- bring copies of your results.  Return to the ER for worsening or persistent symptoms, and/or ANY NEW OR CONCERNING SYMPTOMS. If you have issues obtaining follow up, please call: [phone] to obtain a doctor or specialist who takes your insurance in your area.  You may call [phone] to make an appointment with the internal medicine clinic.
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
## 2024-07-22 NOTE — ED PROVIDER NOTE - PATIENT PORTAL LINK FT
You can access the FollowMyHealth Patient Portal offered by [facility] by registering at the following website: [email or web address]. By joining [employer]’s FollowMyHealth portal, you will also be able to view your health information using other applications (apps) compatible with our system.
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
## 2024-10-14 NOTE — ED PROVIDER NOTE - CPE EDP GU CVA TENDER
Refill approved for 1 month.  Xochilt Gonzales needs to be seen for an appointment before further refills are given.     no tenderness

## (undated) DEVICE — SUT MONOCRYL 4-0 27" PS-2 UNDYED

## (undated) DEVICE — TROCAR COVIDIEN VERSAONE BLADED FIXATION 11MM STANDARD

## (undated) DEVICE — PROTECTOR HEEL / ELBOW

## (undated) DEVICE — WARMING BLANKET UPPER ADULT

## (undated) DEVICE — TUBING STRYKER PNEUMOCLEAR SMOKE HEAT HUMID

## (undated) DEVICE — ELCTR GROUNDING PAD ADULT COVIDIEN

## (undated) DEVICE — DRAPE TOWEL BLUE 17" X 24"

## (undated) DEVICE — ELCTR BOVIE TIP BLADE INSULATED 2.75" EDGE

## (undated) DEVICE — BASIN SET SINGLE

## (undated) DEVICE — POSITIONER STRAP ARMBOARD VELCRO TS-30

## (undated) DEVICE — DRSG STERISTRIPS 0.5 X 4"

## (undated) DEVICE — SOL IRR POUR H2O 500ML

## (undated) DEVICE — DISSECTOR ENDOSCOPIC KITTNER SINGLE TIP

## (undated) DEVICE — ENDOCATCH 10MM SPECIMEN POUCH

## (undated) DEVICE — TROCAR COVIDIEN VERSAONE BLADELESS FIXATION 11MM STANDARD

## (undated) DEVICE — TUBING STRYKEFLOW II SUCTION / IRRIGATOR

## (undated) DEVICE — SOL IRR POUR NS 0.9% 1000ML

## (undated) DEVICE — DISSECTOR KITTNER 5 MM TIP

## (undated) DEVICE — DRAPE 3/4 SHEET 52X76"

## (undated) DEVICE — SYR LUER LOK 20CC

## (undated) DEVICE — TUBING INSUFFLATION LAP FILTER 10FT

## (undated) DEVICE — TROCAR COVIDIEN VERSAPORT BLADELESS OPTICAL 5MM STANDARD

## (undated) DEVICE — TUBING OLYMPUS INSUFFLATION

## (undated) DEVICE — PACK GENERAL LAPAROSCOPY

## (undated) DEVICE — D HELP - CLEARVIEW CLEARIFY SYSTEM

## (undated) DEVICE — SUT VICRYL 0 27" UR-6

## (undated) DEVICE — CANISTER DISPOSABLE THIN WALL 3000CC

## (undated) DEVICE — TROCAR COVIDIEN BLUNT TIP HASSAN 10MM

## (undated) DEVICE — TROCAR COVIDIEN BLUNT TIP HASSAN 10MM STANDARD

## (undated) DEVICE — GLV 7.5 PROTEXIS (WHITE)

## (undated) DEVICE — VENODYNE/SCD SLEEVE CALF MEDIUM

## (undated) DEVICE — BLADE SURGICAL #15 CARBON

## (undated) DEVICE — TROCAR COVIDIEN VERSAONE FIXATION CANNULA 5MM

## (undated) DEVICE — TIP METZENBAUM SCISSOR MONOPOLAR ENDOCUT (ORANGE)